# Patient Record
Sex: FEMALE | Race: WHITE | NOT HISPANIC OR LATINO | URBAN - METROPOLITAN AREA
[De-identification: names, ages, dates, MRNs, and addresses within clinical notes are randomized per-mention and may not be internally consistent; named-entity substitution may affect disease eponyms.]

---

## 2019-05-18 ENCOUNTER — HOSPITAL ENCOUNTER (OUTPATIENT)
Facility: CLINIC | Age: 69
Discharge: LEFT BEFORE DISCHARGE | End: 2019-05-18
Payer: MEDICARE

## 2019-06-26 ENCOUNTER — TRANSCRIBE ORDERS (OUTPATIENT)
Dept: REGISTRATION | Age: 69
End: 2019-06-26

## 2019-06-26 DIAGNOSIS — M75.01 ADHESIVE CAPSULITIS OF RIGHT SHOULDER: ICD-10-CM

## 2019-06-26 DIAGNOSIS — M75.111 INCOMPLETE TEAR OF RIGHT ROTATOR CUFF: Primary | ICD-10-CM

## 2019-06-26 DIAGNOSIS — M75.02 ADHESIVE CAPSULITIS OF LEFT SHOULDER: ICD-10-CM

## 2019-07-08 ENCOUNTER — HOSPITAL ENCOUNTER (OUTPATIENT)
Dept: PHYSICAL THERAPY | Age: 69
Setting detail: THERAPIES SERIES
Discharge: HOME | End: 2019-07-08
Attending: ORTHOPAEDIC SURGERY
Payer: MEDICARE

## 2019-07-08 DIAGNOSIS — M75.02 ADHESIVE CAPSULITIS OF LEFT SHOULDER: ICD-10-CM

## 2019-07-08 DIAGNOSIS — M75.111 INCOMPLETE TEAR OF RIGHT ROTATOR CUFF: ICD-10-CM

## 2019-07-08 DIAGNOSIS — M75.01 ADHESIVE CAPSULITIS OF RIGHT SHOULDER: ICD-10-CM

## 2019-07-08 PROCEDURE — 97161 PT EVAL LOW COMPLEX 20 MIN: CPT | Mod: GP

## 2019-07-08 PROCEDURE — 97530 THERAPEUTIC ACTIVITIES: CPT | Mod: GP

## 2019-07-08 RX ORDER — CELECOXIB 200 MG/1
200 CAPSULE ORAL 2 TIMES DAILY
COMMUNITY

## 2019-07-08 RX ORDER — GABAPENTIN 300 MG/1
300 CAPSULE ORAL 3 TIMES DAILY
COMMUNITY

## 2019-07-08 RX ORDER — CITALOPRAM 20 MG/1
30 TABLET, FILM COATED ORAL DAILY
COMMUNITY

## 2019-07-08 NOTE — OP PT TREATMENT LOG
Precautions    Treatment Grp Current Session Time   Modalities Total Time for Session Not performed   Heat/Ice CP PRN    Vasocompression    Modalities Total Time for Session Not performed   TENS    NMES    HWAVE    Manual  Total Time for Session Not performed   STM/MFR/TPR STM/TPR posterior shoulder and pec   Instrument Assisted STM     Mobilizations Distraction and grade 1/2 PRN   ROM/Flexibility    Myofascial Decompression    Therapeutic Exercise Total Time for Session Not performed    Sets Reps Load Comment    Shoulder Extension        Hor Abd     Pronated    Hor Abd     Supinated   Protracion     Quadruped   Protraction      Full Plank on Wall    TRX Row                                   Neuromuscular Re-Education Total Time for Session Not performed    Sets Reps Load Comment                                   Alternating Isometrics     Manual Isometrics     Gait Total Time for Session Not performed           Therapeutic Activity Total Time for Session 8-22 Minutes   Patient Education HEP reviewed and POC education completed       Group Total Time for Session Not performed

## 2019-07-08 NOTE — PROGRESS NOTES
Referring Provider: By co-signing this Plan of Care (POC), you agree with the planned services and interventions recommended by the therapist.         PT EVALUATION FOR OUTPATIENT THERAPY    Patient: Nicole Tong    MRN: [id number]  : 1950 68 y.o.   Referring Physician: Checo Petit MD  Date of Visit: 19    Certification Dates:   19 through 19    Recommended Frequency & Duration:  2 times/week for up to 8 weeks     Diagnosis:   1. Incomplete tear of right rotator cuff    2. Adhesive capsulitis of left shoulder    3. Adhesive capsulitis of right shoulder        Past Medical History:   Past Medical History:   Diagnosis Date   • DDD (degenerative disc disease), lumbar    • Migraine    • OA (osteoarthritis)    • Osteoporosis    • Scoliosis        Past Surgical History: No past surgical history on file.          General Information - 19 0809        Session Details    Document Type initial evaluation    Mode of Treatment physical therapy    Patient/Family Observations Bilateral shoulder pain with right greater than left.  Right side neck pain that comes up into my head.  I started having pain in my shoulders while I was doing yoga 3 to 4 days a week, I thought I was building strength, I have been doing yoga for years.  I fell out of a yoga pose in March and landed on my right shoulder, than I fell off a curb while talking to my friend  and than it really started to bother me.  Since than I have not been doing yoga I have only been doing walking, right hand dominant.  My neck is also bad right now and I have been getting shots in my neck, they help with my migraine, I have been getting migraines for about 2 years since I lost my son.         Time Calculation    Start Time 807    Stop Time 900    Time Calculation (min) 53 min            Falls - 19 0816        Initial Falls Assessment    One or more falls in the last year Yes    How many times 2 or more    Was the  patient injured in any fall No    Fall prevention interventions recommended Educate and re-educate the patient on safety strategies            Pain/Vitals - 07/08/19 0814        Pain/Comfort/Sleep    Presence of Pain complains of pain/discomfort    Preferred Pain Scale number (Numeric Rating Pain Scale)    Pain Body Location shoulder    Pain Rating (0-10): Pre Activity 0   At rest     Pain Rating (0-10): Activity 8   Lifting     Pain Rating (0-10): Post Activity 2       Pain Intervention    Intervention  Evaluation     Post Intervention Comments HEP and POC education            Range of Motion - 07/08/19 0800        RIGHT: Upper Extremity PROM Assessment    Shoulder Abduction Deficit 170 degrees       LEFT: Upper Extremity AROM Assessment    Shoulder Flexion Deficit 165 degrees    Shoulder Abduction Deficit 170 degrees    Shoulder Internal Rotation Deficit 65 degrees    Shoulder External Rotation Deficit 98 degrees       RIGHT: Upper Extremity AROM Assessment    Shoulder Flexion Deficit 165 degrees    Shoulder Abduction Deficit 155 degrees    Shoulder Internal Rotation Deficit 53 degrees    Shoulder External Rotation Deficit 102 degrees       Cervical AROM    Cervical Flexion 40    Cervical Extension 35    Cervical Left SB 34    Cervical Right SB 37    Cervical Left Rotation 57    Cervical Right Rotation 54            Manual Muscle Tests - 07/08/19 0834        RIGHT: Upper Extremity Manual Muscle Test Assessment    Right UE MMT comments: Hand Held Dynamometry    Shoulder Flexion gross movement --   7.3#    Shoulder Extension gross movement --   11.9#    Shoulder Abduction gross movement --   7.3#    Shoulder Internal Rotation gross movement --   6.9#    Shoulder External Rotation gross movement --   10.8#    Elbow Flex gross movement --   8.1#    Elbow Extension gross movement --   11.5#       LEFT: Upper Extremity Manual Muscle Test Assessment    Left UE MMT comments: Hand Held Dynamometry    Shoulder Flexion gross  movement --   8.8#    Shoulder Extension gross movement --   15.4#    Shoulder Abduction gross movement --   5#    Shoulder Internal Rotation gross movement --   9.6#    Shoulder External Rotation gross movement --   9.2#    Elbow Flexion gross movement --   14.6#    Elbow Extension gross movement --   10.8#            Evaluation Assessment and Plan - 07/08/19 1619        Evaluation Assessment and Plan    Plan of Care reviewed and patient/family in agreement Yes    System Pathology/Pathophysiology Noted musculoskeletal    Functional Limitations in Following Categories (PT Eval) self-care;home management;community/leisure    Rehab Potential/Prognosis good, to achieve stated therapy goals    Problem List decreased strength;impaired motor control;pain    Clinical Assessment Nicole is a 69 y/o female with reports of right > left shoulder pain over several months that was exacerbated with yoga and daily activity.  She continued to experience pain with lifting overhead and underwent MRI imaging that demonstrated right subscapularis, infraspinatus tear, and labral degeneration.  She notes that during yoga she would feel a click or unstable position at times when reaching overhead however she thought this was ok as per the instructor.  Nicole prefers to stretch and does not prefer to complete strength exercises when she works out independently.  Therapy evaluation demonstrates bilateral shoulder hypermobliity and joint laxity.  AROM is WNl into all planes when place in antigravity positions with a painful arc noted when actively moving with resistance.  Strength is grossly diminished with pain only noted during shoulder abduction bilaterally during MMT.  Nicole also reports right cervical pain with associated migraines that she has had x 2 years since her son passed away.  She has noted SCM and suboccipital restrictions with radicular symptoms into her frontal lobe with palpation.  Cervicla AROM is WNL with negative spurlings,  intact DTR, and no production of radicular symptoms with examination.  Nicole will progress with physical therapy to decrease pain, improve shoulder stabilization, manage soft tissue restrictions and reach stated goals.  Continue with physical therapy 2x a week x 8 weeks.     Planned Services CPT 36994 Therapeutic activities;CPT 43628 Manual therapy;CPT 19837 Hot/Cold Packs therapy;CPT 62458 Therpeutic exercises;CPT 38110 Neuromuscular Reeducation              Precautions    Treatment Grp Current Session Time   Modalities Total Time for Session Not performed   Heat/Ice CP PRN    Vasocompression    Modalities Total Time for Session Not performed   TENS    NMES    HWAVE    Manual  Total Time for Session Not performed   STM/MFR/TPR STM/TPR posterior shoulder and pec   Instrument Assisted STM     Mobilizations Distraction and grade 1/2 PRN   ROM/Flexibility    Myofascial Decompression    Therapeutic Exercise Total Time for Session Not performed    Sets Reps Load Comment    Shoulder Extension        Hor Abd     Pronated    Hor Abd     Supinated   Protracion     Quadruped   Protraction      Full Plank on Wall    TRX Row                                   Neuromuscular Re-Education Total Time for Session Not performed    Sets Reps Load Comment                                   Alternating Isometrics     Manual Isometrics     Gait Total Time for Session Not performed           Therapeutic Activity Total Time for Session 8-22 Minutes   Patient Education HEP reviewed and POC education completed       Group Total Time for Session Not performed                             Goals:  Goals        Patient Stated    • Patient (pt-stated)            Return to yoga and use my arms without pain            Other    • LTG Shoulder            Long Term Goals Time Frame Result Comment/Progress   Pt will increase bilateral UE MMT into flexion, abduction, ER and IR >/= 8# pain free 8  weeks     Pt will increase bilatera UE ROM WNL pain free to  return to yoga class  8  weeks     Lift overhead pain free  8  weeks     Increase CRISTINA >/= 90% to increase function   8  weeks     Become independent with HEP  8  weeks     Verbalize understanding of hypermobility and safe exercise  8   weeks     Decrease pain at worst </= 2/10  8  weeks               • STG Shoulder            Short Term Goals Time Frame Result Comment/Progress   Pt will increase bilateral UE MMT into flexion, abduction, ER and IR >/= 5# 4  weeks     Pt will increase bilateral UE ROM >/= WNL into flexion, extension to improve functional mobility without pain or fear of pain 4  weeks     Complete closed chain protraction and retraction pain free 4  weeks     Increase CRISTINA >/= 10 points to increase function   4  weeks  Initial 55%   Become independent with HEP  4  weeks     Decrease pain at worst </= 4/10  4  weeks                         This 68 y.o. year old female presents to PT with above stated diagnosis. Physical Therapy evaluation reveals decreased strength, impaired motor control, pain resulting in self-care, home management, community/leisure limitations. Nicole Tong will benefit from skilled PT services to address limitation, work towards rehab and patient goals and maximize PLOF of chosen ADLs.     Planned Services: The patient's treatment will include gait training, joint and soft tissue mobilization, manual therapy, neuromuscular re-education, physical reconditioning, therapeutic activities, therapeutic exercises and attended E-Stim.

## 2019-07-08 NOTE — LETTER
[address]  [place] [address]  [facility] OP Therapy Fax: [phone]    PHYSICAL THERAPY PLAN OF CARE    Patient Name: Nicole Tong    Certification Dates:  From 19  To: 19  Frequency: 2 times/week Duration: 8 weeks  Other:      Provider: Benito Espinal PT   Referring Provider: Checo Petit MD  PCP: Nicole Ibrahim MD      Payor: Payor: MEDICARE / Plan: MEDICARE PART A & B / Product Type: Medicare /   Medical Diagnosis: No primary diagnosis found.     Rehab Potential: good, to achieve stated therapy goals    Planned Services: The patient's treatment will include CPT 09930 Therapeutic activities, CPT 85576 Manual therapy, CPT 58927 Hot/Cold Packs therapy, CPT 94646 Therpeutic exercises, CPT 69573 Neuromuscular Reeducation, .     By signing this plan of care, I certify this plan of care as correct and necessary for the patient.        Physician Signature: _________________________________________ Date: _________________    Thank you for this referral. Please contact our department with any questions.      Benito Espinal PT        Referring Provider: By co-signing this Plan of Care (POC), you agree with the planned services and interventions recommended by the therapist.         PT EVALUATION FOR OUTPATIENT THERAPY    Patient: Nicole Tong    MRN: [id number]  : 1950 68 y.o.   Referring Physician: Checo Petit MD  Date of Visit: 19    Certification Dates:   19 through 19    Recommended Frequency & Duration:  2 times/week for up to 8 weeks     Diagnosis:   1. Incomplete tear of right rotator cuff    2. Adhesive capsulitis of left shoulder    3. Adhesive capsulitis of right shoulder        Past Medical History:   Past Medical History:   Diagnosis Date   • DDD (degenerative disc disease), lumbar    • Migraine    • OA (osteoarthritis)    • Osteoporosis    • Scoliosis        Past Surgical History: No past surgical history on file.          General Information -  07/08/19 0809        Session Details    Document Type initial evaluation    Mode of Treatment physical therapy    Patient/Family Observations Bilateral shoulder pain with right greater than left.  Right side neck pain that comes up into my head.  I started having pain in my shoulders while I was doing yoga 3 to 4 days a week, I thought I was building strength, I have been doing yoga for years.  I fell out of a yoga pose in March and landed on my right shoulder, than I fell off a curb while talking to my friend April 3rd and than it really started to bother me.  Since than I have not been doing yoga I have only been doing walking, right hand dominant.  My neck is also bad right now and I have been getting shots in my neck, they help with my migraine, I have been getting migraines for about 2 years since I lost my son.         Time Calculation    Start Time 0807    Stop Time 0900    Time Calculation (min) 53 min            Falls - 07/08/19 0816        Initial Falls Assessment    One or more falls in the last year Yes    How many times 2 or more    Was the patient injured in any fall No    Fall prevention interventions recommended Educate and re-educate the patient on safety strategies            Pain/Vitals - 07/08/19 0814        Pain/Comfort/Sleep    Presence of Pain complains of pain/discomfort    Preferred Pain Scale number (Numeric Rating Pain Scale)    Pain Body Location shoulder    Pain Rating (0-10): Pre Activity 0   At rest     Pain Rating (0-10): Activity 8   Lifting     Pain Rating (0-10): Post Activity 2       Pain Intervention    Intervention  Evaluation     Post Intervention Comments HEP and POC education            Range of Motion - 07/08/19 0800        RIGHT: Upper Extremity PROM Assessment    Shoulder Abduction Deficit 170 degrees       LEFT: Upper Extremity AROM Assessment    Shoulder Flexion Deficit 165 degrees    Shoulder Abduction Deficit 170 degrees    Shoulder Internal Rotation Deficit 65 degrees     Shoulder External Rotation Deficit 98 degrees       RIGHT: Upper Extremity AROM Assessment    Shoulder Flexion Deficit 165 degrees    Shoulder Abduction Deficit 155 degrees    Shoulder Internal Rotation Deficit 53 degrees    Shoulder External Rotation Deficit 102 degrees       Cervical AROM    Cervical Flexion 40    Cervical Extension 35    Cervical Left SB 34    Cervical Right SB 37    Cervical Left Rotation 57    Cervical Right Rotation 54            Manual Muscle Tests - 07/08/19 0834        RIGHT: Upper Extremity Manual Muscle Test Assessment    Right UE MMT comments: Hand Held Dynamometry    Shoulder Flexion gross movement --   7.3#    Shoulder Extension gross movement --   11.9#    Shoulder Abduction gross movement --   7.3#    Shoulder Internal Rotation gross movement --   6.9#    Shoulder External Rotation gross movement --   10.8#    Elbow Flex gross movement --   8.1#    Elbow Extension gross movement --   11.5#       LEFT: Upper Extremity Manual Muscle Test Assessment    Left UE MMT comments: Hand Held Dynamometry    Shoulder Flexion gross movement --   8.8#    Shoulder Extension gross movement --   15.4#    Shoulder Abduction gross movement --   5#    Shoulder Internal Rotation gross movement --   9.6#    Shoulder External Rotation gross movement --   9.2#    Elbow Flexion gross movement --   14.6#    Elbow Extension gross movement --   10.8#            Evaluation Assessment and Plan - 07/08/19 1619        Evaluation Assessment and Plan    Plan of Care reviewed and patient/family in agreement Yes    System Pathology/Pathophysiology Noted musculoskeletal    Functional Limitations in Following Categories (PT Eval) self-care;home management;community/leisure    Rehab Potential/Prognosis good, to achieve stated therapy goals    Problem List decreased strength;impaired motor control;pain    Clinical Assessment Nicole is a 67 y/o female with reports of right > left shoulder pain over several months that was  exacerbated with yoga and daily activity.  She continued to experience pain with lifting overhead and underwent MRI imaging that demonstrated right subscapularis, infraspinatus tear, and labral degeneration.  She notes that during yoga she would feel a click or unstable position at times when reaching overhead however she thought this was ok as per the instructor.  Nicole prefers to stretch and does not prefer to complete strength exercises when she works out independently.  Therapy evaluation demonstrates bilateral shoulder hypermobliity and joint laxity.  AROM is WNl into all planes when place in antigravity positions with a painful arc noted when actively moving with resistance.  Strength is grossly diminished with pain only noted during shoulder abduction bilaterally during MMT.  Nicole also reports right cervical pain with associated migraines that she has had x 2 years since her son passed away.  She has noted SCM and suboccipital restrictions with radicular symptoms into her frontal lobe with palpation.  Cervicla AROM is WNL with negative spurlings, intact DTR, and no production of radicular symptoms with examination.  Nicole will progress with physical therapy to decrease pain, improve shoulder stabilization, manage soft tissue restrictions and reach stated goals.  Continue with physical therapy 2x a week x 8 weeks.     Planned Services CPT 32641 Therapeutic activities;CPT 07894 Manual therapy;CPT 20665 Hot/Cold Packs therapy;CPT 72520 Therpeutic exercises;CPT 80873 Neuromuscular Reeducation              Precautions    Treatment Grp Current Session Time   Modalities Total Time for Session Not performed   Heat/Ice CP PRN    Vasocompression    Modalities Total Time for Session Not performed   TENS    NMES    HWAVE    Manual  Total Time for Session Not performed   STM/MFR/TPR STM/TPR posterior shoulder and pec   Instrument Assisted STM     Mobilizations Distraction and grade 1/2 PRN   ROM/Flexibility    Myofascial  Decompression    Therapeutic Exercise Total Time for Session Not performed    Sets Reps Load Comment    Shoulder Extension        Hor Abd     Pronated    Hor Abd     Supinated   Protracion     Quadruped   Protraction      Full Plank on Wall    TRX Row                                   Neuromuscular Re-Education Total Time for Session Not performed    Sets Reps Load Comment                                   Alternating Isometrics     Manual Isometrics     Gait Total Time for Session Not performed           Therapeutic Activity Total Time for Session 8-22 Minutes   Patient Education HEP reviewed and POC education completed       Group Total Time for Session Not performed                             Goals:  Goals        Patient Stated    • Patient (pt-stated)            Return to yoga and use my arms without pain            Other    • LTG Shoulder            Long Term Goals Time Frame Result Comment/Progress   Pt will increase bilateral UE MMT into flexion, abduction, ER and IR >/= 8# pain free 8  weeks     Pt will increase bilatera UE ROM WNL pain free to return to yoga class  8  weeks     Lift overhead pain free  8  weeks     Increase CRISTINA >/= 90% to increase function   8  weeks     Become independent with HEP  8  weeks     Verbalize understanding of hypermobility and safe exercise  8   weeks     Decrease pain at worst </= 2/10  8  weeks               • STG Shoulder            Short Term Goals Time Frame Result Comment/Progress   Pt will increase bilateral UE MMT into flexion, abduction, ER and IR >/= 5# 4  weeks     Pt will increase bilateral UE ROM >/= WNL into flexion, extension to improve functional mobility without pain or fear of pain 4  weeks     Complete closed chain protraction and retraction pain free 4  weeks     Increase CRISTINA >/= 10 points to increase function   4  weeks  Initial 55%   Become independent with HEP  4  weeks     Decrease pain at worst </= 4/10  4  weeks                         This 68 y.o.  year old female presents to PT with above stated diagnosis. Physical Therapy evaluation reveals decreased strength, impaired motor control, pain resulting in self-care, home management, community/leisure limitations. Nicole Tong will benefit from skilled PT services to address limitation, work towards rehab and patient goals and maximize PLOF of chosen ADLs.     Planned Services: The patient's treatment will include gait training, joint and soft tissue mobilization, manual therapy, neuromuscular re-education, physical reconditioning, therapeutic activities, therapeutic exercises and attended E-Stim.

## 2019-07-10 ENCOUNTER — HOSPITAL ENCOUNTER (OUTPATIENT)
Dept: PHYSICAL THERAPY | Age: 69
Setting detail: THERAPIES SERIES
Discharge: HOME | End: 2019-07-10
Attending: ORTHOPAEDIC SURGERY
Payer: MEDICARE

## 2019-07-10 DIAGNOSIS — M75.02 ADHESIVE CAPSULITIS OF LEFT SHOULDER: ICD-10-CM

## 2019-07-10 DIAGNOSIS — M75.01 ADHESIVE CAPSULITIS OF RIGHT SHOULDER: ICD-10-CM

## 2019-07-10 DIAGNOSIS — M75.111 INCOMPLETE TEAR OF RIGHT ROTATOR CUFF: Primary | ICD-10-CM

## 2019-07-10 PROCEDURE — 97530 THERAPEUTIC ACTIVITIES: CPT | Mod: GP

## 2019-07-10 PROCEDURE — 97110 THERAPEUTIC EXERCISES: CPT | Mod: GP

## 2019-07-10 NOTE — PROGRESS NOTES
PT DAILY NOTE FOR OUTPATIENT THERAPY    Patient: Nicole Tong   MRN: [id number]  : 1950 68 y.o.  Referring Physician: Checo Petit MD  Date of Visit: 7/10/2019      Certification Dates: 19 through 19    Diagnosis:   1. Incomplete tear of right rotator cuff    2. Adhesive capsulitis of left shoulder    3. Adhesive capsulitis of right shoulder            TODAY'S VISIT          General Information - 07/10/19 1002        Session Details    Document Type daily treatment    Mode of Treatment physical therapy    Patient/Family Observations No pain at rest, had some pain this morning when getting washed this morning.         Time Calculation    Start Time 1000    Stop Time 1100    Time Calculation (min) 60 min              Pain/Vitals - 07/10/19 1003        Pain/Comfort/Sleep    Presence of Pain complains of pain/discomfort    Preferred Pain Scale number (Numeric Rating Pain Scale)    Pain Body Location shoulder    Pain Rating (0-10): Pre Activity 0    Pain Rating (0-10): Activity 2    Pain Rating (0-10): Post Activity 0       Pain Intervention    Intervention  Manual techniques    Post Intervention Comments Sore in both shoulders.               Daily Falls Screen - 07/10/19 1004        Daily Falls Assessment    Patient reported fall since last visit No              Daily Treatment Assessment and Plan - 07/10/19 1050        Daily Treatment Assessment and Plan    Progress toward goals Progressing    Daily Outcome Summary Nicole reports semi-compliance with HEP since evaluation and continued right > left shoulder pain with daily activity this morning.  She was able to walk x 30 minutes pain free however has not returned to normal exercise routine.   Through session Nicole noted that she can not attain quadruped due to right knee pain and is uncomfotable in prone due to a previous history of disc problems.  Good tolerance for manual techniques today with full pain free PROM that is unrestricted today,  initial patient apprehension present with PROM.  VC and MC required today with alternating isometrics and ther ex today to maintain scapular depression.  Education provided on high rep low resistance exericise and encouraged to continue with fitness center activity including LE and trunk stabilization.  Nicole will continue to benefit from therapy to improve neuromuscular control and decrease pain with daily activity.    Plan and Recommendations Progress stabilization exercsies next session.             Today's Treatment:        Precautions Avoid prone and quadruped   Treatment Grp Current Session Time   Modalities Total Time for Session Not performed   Heat/Ice CP PRN    Vasocompression    Modalities Total Time for Session Not performed   TENS    NMES    HWAVE    Manual  Total Time for Session 8-22 Minutes   STM/MFR/TPR STM/TPR posterior shoulder and pec   Instrument Assisted STM     Mobilizations Distraction and grade 1/2 PRN   ROM/Flexibility    Myofascial Decompression    Therapeutic Exercise Total Time for Session 23-37 Minutes    Sets Reps Load Comment    Shoulder Extension   15 [place] hor abd at bottom of pull, MC for abdominal control   Hor Abd   15 [place] Pronated, seated   Hor Abd   15 [place] Supinated, seated   Protract/Retract   10  Full Plank on Wall    TRX Row                                   Neuromuscular Re-Education Total Time for Session 8-22 Minutes    Sets Reps Load Comment                                   Alternating Isometrics  90 degrees of flexion x 3 each    Manual Isometrics  ER through range with control of depression with MC   Gait Total Time for Session Not performed           Therapeutic Activity Total Time for Session Not performed           Group Total Time for Session Not performed

## 2019-07-10 NOTE — OP PT TREATMENT LOG
Precautions Avoid prone and quadruped   Treatment Grp Current Session Time   Modalities Total Time for Session Not performed   Heat/Ice CP PRN    Vasocompression    Modalities Total Time for Session Not performed   TENS    NMES    HWAVE    Manual  Total Time for Session 8-22 Minutes   STM/MFR/TPR STM/TPR posterior shoulder and pec   Instrument Assisted STM     Mobilizations Distraction and grade 1/2 PRN   ROM/Flexibility    Myofascial Decompression    Therapeutic Exercise Total Time for Session 23-37 Minutes    Sets Reps Load Comment    Shoulder Extension   15 [place] hor abd at bottom of pull, MC for abdominal control   Hor Abd   15 [place] Pronated, seated   Hor Abd   15 [place] Supinated, seated   Protract/Retract   10  Full Plank on Wall    TRX Row                                   Neuromuscular Re-Education Total Time for Session 8-22 Minutes    Sets Reps Load Comment                                   Alternating Isometrics  90 degrees of flexion x 3 each    Manual Isometrics  ER through range with control of depression with MC   Gait Total Time for Session Not performed           Therapeutic Activity Total Time for Session Not performed           Group Total Time for Session Not performed

## 2019-07-10 NOTE — OP PT TREATMENT LOG
Precautions    Treatment Grp Current Session Time   Modalities Total Time for Session Not performed   Heat/Ice CP PRN    Vasocompression    Modalities Total Time for Session Not performed   TENS    NMES    HWAVE    Manual  Total Time for Session 8-22 Minutes   STM/MFR/TPR STM/TPR posterior shoulder and pec   Instrument Assisted STM     Mobilizations Distraction and grade 1/2 PRN   ROM/Flexibility    Myofascial Decompression    Therapeutic Exercise Total Time for Session 23-37 Minutes    Sets Reps Load Comment    Shoulder Extension   15 [place] Pink hor abd at bottom of pull, MC for abdominal control   Hor Abd   15 [place] Pronated, seated   Hor Abd   15 [place] Supinated, seated   Protract/Retract   10  Full Plank on Wall    TRX Row                                   Neuromuscular Re-Education Total Time for Session 8-22 Minutes    Sets Reps Load Comment                                   Alternating Isometrics  90 degrees of flexion x 3 each    Manual Isometrics  ER through range with control of depression with MC   Gait Total Time for Session Not performed           Therapeutic Activity Total Time for Session Not performed           Group Total Time for Session Not performed

## 2019-07-24 ENCOUNTER — HOSPITAL ENCOUNTER (OUTPATIENT)
Dept: PHYSICAL THERAPY | Age: 69
Setting detail: THERAPIES SERIES
Discharge: HOME | End: 2019-07-24
Attending: ORTHOPAEDIC SURGERY
Payer: MEDICARE

## 2019-07-24 DIAGNOSIS — M75.02 ADHESIVE CAPSULITIS OF LEFT SHOULDER: ICD-10-CM

## 2019-07-24 DIAGNOSIS — M75.01 ADHESIVE CAPSULITIS OF RIGHT SHOULDER: ICD-10-CM

## 2019-07-24 DIAGNOSIS — M75.111 INCOMPLETE TEAR OF RIGHT ROTATOR CUFF: Primary | ICD-10-CM

## 2019-07-24 PROCEDURE — 97110 THERAPEUTIC EXERCISES: CPT | Mod: 59

## 2019-07-24 PROCEDURE — 97530 THERAPEUTIC ACTIVITIES: CPT | Mod: 59

## 2019-07-24 NOTE — OP PT TREATMENT LOG
Precautions Avoid prone and quadruped   Treatment Grp Current Session Time   Modalities Total Time for Session Not performed   Heat/Ice CP PRN    Vasocompression    Modalities Total Time for Session Not performed   TENS    NMES    HWAVE    Manual  Total Time for Session 8-22 Minutes   STM/MFR/TPR STM/TPR posterior shoulder and pec   Instrument Assisted STM     Mobilizations Distraction and grade 1/2    ROM/Flexibility    Myofascial Decompression    Therapeutic Exercise Total Time for Session 23-37 Minutes    Sets Reps Load Comment    Shoulder Flexion    10  Pink  Supine Hor Abd bias, VC for GH ER    Shoulder Flexion   2 15 [place]  Supine   Shoulder Extension  2 15 [place] hor abd at bottom of pull, MC for abdominal control   Shoulder ER U/L  2 15 [place]    Shoulder ER B/L  2 15 [place]    Hor Abd   15 [place] Pronated, standing   Hor Abd   15 [place] Supinated, standing                                             Neuromuscular Re-Education Total Time for Session Not performed    Sets Reps Load Comment                                             Gait Total Time for Session Not performed           Therapeutic Activity Total Time for Session 8-22 Minutes   HEP Progression Educated on HEP progression and POC       Group Total Time for Session Not performed

## 2019-07-24 NOTE — PROGRESS NOTES
PT DAILY NOTE FOR OUTPATIENT THERAPY    Patient: Nicole Tong   MRN: [id number]  : 1950 68 y.o.  Referring Physician: Checo Petit MD  Date of Visit: 2019      Certification Dates: 19 through 19    Diagnosis:   1. Incomplete tear of right rotator cuff    2. Adhesive capsulitis of left shoulder    3. Adhesive capsulitis of right shoulder          TODAY'S VISIT          General Information - 19 0906        Session Details    Document Type daily treatment    Mode of Treatment physical therapy    Patient/Family Observations I keep doing stupid stuff and it continues to bother me. The last time I had pain was in the car driving here in my biceps.     OP [facility]       Time Calculation    Start Time 0900    Stop Time 1000    Time Calculation (min) 60 min              Pain/Vitals - 19 0908        Pain/Comfort/Sleep    Presence of Pain complains of pain/discomfort    Preferred Pain Scale number (Numeric Rating Pain Scale)    Pain Body Location arm    Pain Rating (0-10): Pre Activity 0    Pain Rating (0-10): Activity 2    Pain Rating (0-10): Post Activity 0       Pain Intervention    Intervention  Manual technqiues    Post Intervention Comments See assessment       Pain    Pain Body Location - Side Bilateral              Daily Falls Screen - 19 0908        Daily Falls Assessment    Patient reported fall since last visit No              Daily Treatment Assessment and Plan - 19 0946        Daily Treatment Assessment and Plan    Progress toward goals Progressing    Daily Outcome Summary Nicole followed up with Dr. Petit who recommended right shoulder surgery, Nicole has been away for 2 weeks and has been compliant with HEP.  She presents with full PROM into all planes pain free with minimal loss of motion into IR.  AROM of right and left shoulder are also WNL however Nicole notes right greater than left shoulder soreness.   She was able to complete all exercises  today pain free with VC for exercsie modifications to control tension of band and scapular control.  No limitations in joint mobility noted today with GH gliyosi, grade 1/2 performed for pain management.  Nicole notes difficulty with ADL's and lifting in home and is concerned that without surgery she will be unable to compelte daily tasks.   HEP updated this session for shoulder stabilization as she would like to return to golf and yoga without restriction, education provided on appropriate pain response and muscle soreness.  Continue with PT 2x a week to reach stated goals.      Plan and Recommendations Progress stabilization exercsies next session.             Today's Treatment:        Precautions Avoid prone and quadruped   Treatment Grp Current Session Time   Modalities Total Time for Session Not performed   Heat/Ice CP PRN    Vasocompression    Modalities Total Time for Session Not performed   TENS    NMES    HWAVE    Manual  Total Time for Session 8-22 Minutes   STM/MFR/TPR STM/TPR posterior shoulder and pec   Instrument Assisted STM     Mobilizations Distraction and grade 1/2    ROM/Flexibility    Myofascial Decompression    Therapeutic Exercise Total Time for Session 23-37 Minutes    Sets Reps Load Comment    Shoulder Flexion    10  Pink  Supine Hor Abd bias, VC for GH ER    Shoulder Flexion   2 15 Pink  Supine   Shoulder Extension  2 15 [place] hor abd at bottom of pull, MC for abdominal control   Shoulder ER U/L  2 15 [place]    Shoulder ER B/L  2 15 [place]    Hor Abd   15 [place] Pronated, standing   Hor Abd   15 [place] Supinated, standing                                             Neuromuscular Re-Education Total Time for Session Not performed    Sets Reps Load Comment                                             Gait Total Time for Session Not performed           Therapeutic Activity Total Time for Session 8-22 Minutes   HEP Progression Educated on HEP progression and POC       Group Total Time for  Session Not performed

## 2019-07-26 ENCOUNTER — HOSPITAL ENCOUNTER (OUTPATIENT)
Dept: PHYSICAL THERAPY | Age: 69
Setting detail: THERAPIES SERIES
Discharge: HOME | End: 2019-07-26
Attending: ORTHOPAEDIC SURGERY
Payer: MEDICARE

## 2019-07-26 DIAGNOSIS — M75.111 INCOMPLETE TEAR OF RIGHT ROTATOR CUFF: Primary | ICD-10-CM

## 2019-07-26 PROCEDURE — 97110 THERAPEUTIC EXERCISES: CPT | Mod: GP

## 2019-07-26 PROCEDURE — 97010 HOT OR COLD PACKS THERAPY: CPT | Mod: GP

## 2019-07-26 PROCEDURE — 97140 MANUAL THERAPY 1/> REGIONS: CPT | Mod: GP

## 2019-07-26 NOTE — OP PT TREATMENT LOG
Precautions Avoid prone and quadruped   Treatment Grp Current Session Time   Modalities Total Time for Session 8-22 Minutes   Heat/Ice CP 10 minutes b/l Shoulder   Vasocompression    Modalities Total Time for Session Not performed   TENS    NMES    HWAVE    Manual  Total Time for Session 8-22 Minutes   STM/MFR/TPR STM/TPR posterior shoulder and pec   Instrument Assisted STM     Mobilizations    ROM/Flexibility    Myofascial Decompression    Therapeutic Exercise Total Time for Session 23-37 Minutes    Sets Reps Load Comment    Shoulder Flexion    10  Pink  Supine Hor Abd bias, VC for GH ER    Shoulder Flexion   2 15 Pink  Supine   Shoulder Extension  2 15 [place] hor abd at bottom of pull, MC for abdominal control   Shoulder ER U/L  2 15 [place]    Shoulder ER B/L  2 15 [place]    Hor Abd   15 [place] Pronated, standing   Hor Abd     Supinated, standing                                             Neuromuscular Re-Education Total Time for Session Not performed    Sets Reps Load Comment                                             Gait Total Time for Session Not performed           Therapeutic Activity Total Time for Session Not performed           Group Total Time for Session Not performed

## 2019-07-26 NOTE — PROGRESS NOTES
PT DAILY NOTE FOR OUTPATIENT THERAPY    Patient: Nicole Tong   MRN: [id number]  : 1950 68 y.o.  Referring Physician: Checo Petit MD  Date of Visit: 2019      Certification Dates: 19 through 19    Diagnosis:   1. Incomplete tear of right rotator cuff        TODAY'S VISIT          General Information - 19 1016        Session Details    Document Type daily treatment    Mode of Treatment physical therapy    Patient/Family Observations I have soreness today. But no pain.        Time Calculation    Start Time 1015    Stop Time 1100    Time Calculation (min) 45 min       General Information    Patient Profile Reviewed? yes              Pain/Vitals - 19 1015        Pain/Comfort/Sleep    Presence of Pain denies       Pain Intervention    Intervention  Manual Techniques    Post Intervention Comments See Assessment              Daily Falls Screen - 19 1017        Daily Falls Assessment    Patient reported fall since last visit No              Daily Treatment Assessment and Plan - 19 1106        Daily Treatment Assessment and Plan    Progress toward goals Progressing    Daily Outcome Summary Nicole arrived to PT with minimal pain in her shoulder. Continues with compliance of her HEP. Patient stated she feels tightness in her shoulder and up in to her neck. Nicole stated this causes her headaches. Continued with exercises to strengthen rotator cuff mucles. After completion of exercises patient reported an increase in pain in her left shoulder. Patient also verbalized she has difficulty telling the difference between pain and soreness. Discussed with Primary PT. Ended with CP to B/L shoulders.           Today's Treatment:        Precautions Avoid prone and quadruped   Treatment Grp Current Session Time   Modalities Total Time for Session 8-22 Minutes   Heat/Ice CP 10 minutes b/l Shoulder   Vasocompression    Modalities Total Time for Session Not performed   TENS    NMES     HWAVE    Manual  Total Time for Session 8-22 Minutes   STM/MFR/TPR STM/TPR posterior shoulder and pec   Instrument Assisted STM     Mobilizations    ROM/Flexibility    Myofascial Decompression    Therapeutic Exercise Total Time for Session 23-37 Minutes    Sets Reps Load Comment    Shoulder Flexion    10  Pink  Supine Hor Abd bias, VC for GH ER    Shoulder Flexion   2 15 Pink  Supine   Shoulder Extension  2 15 [place] hor abd at bottom of pull, MC for abdominal control   Shoulder ER U/L  2 15 [place]    Shoulder ER B/L  2 15 [place]    Hor Abd   15 [place] Pronated, standing   Hor Abd     Supinated, standing                                             Neuromuscular Re-Education Total Time for Session Not performed    Sets Reps Load Comment                                             Gait Total Time for Session Not performed           Therapeutic Activity Total Time for Session Not performed           Group Total Time for Session Not performed

## 2019-08-01 ENCOUNTER — HOSPITAL ENCOUNTER (OUTPATIENT)
Dept: PHYSICAL THERAPY | Age: 69
Setting detail: THERAPIES SERIES
Discharge: HOME | End: 2019-08-01
Attending: ORTHOPAEDIC SURGERY
Payer: MEDICARE

## 2019-08-01 DIAGNOSIS — M75.01 ADHESIVE CAPSULITIS OF RIGHT SHOULDER: ICD-10-CM

## 2019-08-01 DIAGNOSIS — M75.111 INCOMPLETE TEAR OF RIGHT ROTATOR CUFF: ICD-10-CM

## 2019-08-01 DIAGNOSIS — M75.02 ADHESIVE CAPSULITIS OF LEFT SHOULDER: Primary | ICD-10-CM

## 2019-08-01 PROCEDURE — 97140 MANUAL THERAPY 1/> REGIONS: CPT | Mod: GP

## 2019-08-01 PROCEDURE — 97110 THERAPEUTIC EXERCISES: CPT | Mod: GP

## 2019-08-01 PROCEDURE — G0283 ELEC STIM OTHER THAN WOUND: HCPCS | Mod: GP

## 2019-08-01 NOTE — OP PT TREATMENT LOG
Precautions Avoid prone and quadruped   Treatment Grp Current Session Time   Modalities Total Time for Session Not performed   Heat/Ice    Vasocompression    Modalities Total Time for Session 8-22 Minutes   TENS    NMES    HWAVE High 3.0 GH joint seated end of session   Manual  Total Time for Session 8-22 Minutes   STM/MFR/TPR    Instrument Assisted STM     Mobilizations Grade 1/2 GH mobilizations posterior and inferior, long axis distraction   ROM/Flexibility PROM right and left shoulder all planes   Myofascial Decompression    Therapeutic Exercise Total Time for Session 23-37 Minutes    Sets Reps Load Comment    Isometric Flexion    5  Punch into wall 25% strength hold 6 seconds   Isometric ER    5  25% hold 6 seconds   Isometric IR   5  25% hold 6 seconds   Isometric Extension    5  25% hold 6 seconds   Isometric Abduction    5  25% hold 6 seconds   Wall Slide   5  AROM eccentric lower   Serratus Slide   5  Elbows up wall                                             Neuromuscular Re-Education Total Time for Session Not performed    Sets Reps Load Comment                                             Gait Total Time for Session Not performed           Therapeutic Activity Total Time for Session Not performed           Group Total Time for Session Not performed

## 2019-08-01 NOTE — PROGRESS NOTES
PT DAILY NOTE FOR OUTPATIENT THERAPY    Patient: Nicole Tong   MRN: [id number]  : 1950 68 y.o.  Referring Physician: Checo Petit MD  Date of Visit: 2019      Certification Dates: 19 through 19    Diagnosis:   1. Adhesive capsulitis of left shoulder    2. Adhesive capsulitis of right shoulder    3. Incomplete tear of right rotator cuff          TODAY'S VISIT          General Information - 19 1005        Session Details    Document Type daily treatment    Mode of Treatment physical therapy    Patient/Family Observations I continue to have pain and it is not getting any better.  It still hurts when I lift anythign up or when I drive.        Time Calculation    Start Time 1005    Stop Time 1100    Time Calculation (min) 55 min              Pain/Vitals - 19 1005        Pain/Comfort/Sleep    Presence of Pain complains of pain/discomfort    Preferred Pain Scale number (Numeric Rating Pain Scale)    Pain Body Location shoulder   Right > left     Pain Rating (0-10): Pre Activity 0    Pain Rating (0-10): Activity 6    Pain Rating (0-10): Post Activity 0       Pain Intervention    Intervention  Manual techniques and Electircal stimulation     Post Intervention Comments See Assessment              Daily Falls Screen - 19 1008        Daily Falls Assessment    Patient reported fall since last visit No              Daily Treatment Assessment and Plan - 19 1045        Daily Treatment Assessment and Plan    Progress toward goals Slower than expected    Daily Outcome Summary Nicole presents to PT noting continued pain stating that her shoulders feel sick.  Her uncle who is 92 just had a RTC repair and he is in excrutiating pain at this time.  She has stopped her HEP due to pain with completion is unable to drive or lift without pain, she is able to blowdry her hair without limitations.  PROM bilateral shoulder is WNL pain free with normal GH mobility and no restriction, AROM  remains significantly limited by pain.  Altered POC to complete isometric activity in home, able to complete pain free in clinic today with good form.  Eccentric lower during wall slide completed pain free.  Ended session with Hwave high for residual pain control following session, no pain noted upon completion of session today.  Nicole has a TENS unit in her home and was educated on completion for shoulder pain as needed.  Nicole will continue to benefit from physical therapy to decrease pain and decerase shoulder reactivity.      Plan and Recommendations Progress note next session, Nicole will be travelling x 10 days to the [facility] following next session.             Today's Treatment:        Precautions Avoid prone and quadruped   Treatment Grp Current Session Time   Modalities Total Time for Session Not performed   Heat/Ice    Vasocompression    Modalities Total Time for Session 8-22 Minutes   TENS    NMES    HWAVE High 3.0 GH joint seated end of session   Manual  Total Time for Session 8-22 Minutes   STM/MFR/TPR    Instrument Assisted STM     Mobilizations Grade 1/2 GH mobilizations posterior and inferior, long axis distraction   ROM/Flexibility PROM right and left shoulder all planes   Myofascial Decompression    Therapeutic Exercise Total Time for Session 23-37 Minutes    Sets Reps Load Comment    Isometric Flexion    5  Punch into wall 25% strength hold 6 seconds   Isometric ER    5  25% hold 6 seconds   Isometric IR   5  25% hold 6 seconds   Isometric Extension    5  25% hold 6 seconds   Isometric Abduction    5  25% hold 6 seconds   Wall Slide   5  AROM eccentric lower   Serratus Slide   5  Elbows up wall                                             Neuromuscular Re-Education Total Time for Session Not performed    Sets Reps Load Comment                                             Gait Total Time for Session Not performed           Therapeutic Activity Total Time for Session Not performed           Group  Total Time for Session Not performed

## 2019-08-06 ENCOUNTER — HOSPITAL ENCOUNTER (OUTPATIENT)
Dept: PHYSICAL THERAPY | Age: 69
Setting detail: THERAPIES SERIES
Discharge: HOME | End: 2019-08-06
Attending: ORTHOPAEDIC SURGERY
Payer: MEDICARE

## 2019-08-06 DIAGNOSIS — M75.02 ADHESIVE CAPSULITIS OF LEFT SHOULDER: Primary | ICD-10-CM

## 2019-08-06 DIAGNOSIS — M75.111 INCOMPLETE TEAR OF RIGHT ROTATOR CUFF: ICD-10-CM

## 2019-08-06 DIAGNOSIS — M75.01 ADHESIVE CAPSULITIS OF RIGHT SHOULDER: ICD-10-CM

## 2019-08-06 PROCEDURE — 97140 MANUAL THERAPY 1/> REGIONS: CPT | Mod: GP

## 2019-08-06 PROCEDURE — 97530 THERAPEUTIC ACTIVITIES: CPT | Mod: 59

## 2019-08-06 PROCEDURE — 97110 THERAPEUTIC EXERCISES: CPT | Mod: GP

## 2019-08-06 NOTE — OP PT TREATMENT LOG
Precautions Avoid prone and quadruped   Treatment Grp Current Session Time   Modalities Total Time for Session Not performed   Heat/Ice    Vasocompression    Modalities Total Time for Session Not performed   TENS    NMES    HWAVE    Manual  Total Time for Session 8-22 Minutes   STM/MFR/TPR    Instrument Assisted STM     Mobilizations Grade 1/2 GH mobilizations posterior and inferior, long axis distraction   ROM/Flexibility PROM right and left shoulder all planes   Myofascial Decompression    Therapeutic Exercise Total Time for Session 23-37 Minutes    Sets Reps Load Comment                                                    Progress Note   Objective assessment completed        Neuromuscular Re-Education Total Time for Session Not performed    Sets Reps Load Comment                                             Gait Total Time for Session Not performed           Therapeutic Activity Total Time for Session 8-22 Minutes   Patient Education  POC education and review of yoga poses to continue in fitness center       Group Total Time for Session Not performed

## 2019-08-06 NOTE — PROGRESS NOTES
PT PROGRESS NOTE FOR OUTPATIENT THERAPY    Patient: Nicole Tong   MRN: [id number]  : 1950 68 y.o.  Referring Physician: Checo Petit MD  Date of Visit: 2019      Certification Dates: 19 through 19    Recommended Frequency & Duration:  2 times/week for up to 8 weeks     Diagnosis:   1. Adhesive capsulitis of left shoulder    2. Adhesive capsulitis of right shoulder    3. Incomplete tear of right rotator cuff          TODAY'S VISIT:          General Information - 19 1004        Session Details    Document Type other (see comments)   Progress Note     Mode of Treatment physical therapy    Patient/Family Observations I feel teribble everything I do has consequences, I did gardening and the next day I felt teribble.  I don't feel it when I do it but the next day I always feel the pain.  I am going to go get another opinion because I do not want to have surgery.  I was really sore after last session and I lost my paper so I have been limited with my exercises.        Time Calculation    Start Time 1000    Stop Time 1100    Time Calculation (min) 60 min              Pain/Vitals - 19 1013        Pain/Comfort/Sleep    Presence of Pain complains of pain/discomfort    Preferred Pain Scale number (Numeric Rating Pain Scale)    Pain Body Location shoulder    Pain Rating (0-10): Pre Activity 0    Pain Rating (0-10): Activity 6    Pain Rating (0-10): Post Activity 0       Pain Intervention    Intervention  Manual techniques and ellectrical stimulation    Post Intervention Comments See Assessment              Daily Falls Screen - 19 1004        Daily Falls Assessment    Patient reported fall since last visit No            OBJECTIVE MEASUREMENTS/DATA:    Eval Assessment          Evaluation Assessment and Plan - 19 1128        Evaluation Assessment and Plan    Plan of Care reviewed and patient/family in agreement Yes    System Pathology/Pathophysiology Noted musculoskeletal     Functional Limitations in Following Categories (PT Eval) self-care;home management;community/leisure    Rehab Potential/Prognosis good, to achieve stated therapy goals    Problem List decreased strength;impaired motor control;pain    Clinical Assessment Nicole is a 69 y/o female wtih right > left shoulder pain for several months that was exacerbated with yoga and daily activity.  Imaging is + for right subscapularis, infraspinatus, and labral degeneration.  She has completed 4 weeks of physical therapy and continues to have right > left shoulder pain with daily activity despite limiting yoga.  Cervical spine demonstrates normal and symmetric ROM with 2+ reflex's and intact sensation.  PROM of right and left shoulder is WNL with noted fear and guarding during overhead motions.  Nicole is able to complete AROM into all planes with right biceps pain during eccentric shoulder flexion and lateral shoulder pain with AROM right shoulder abduction.  Strength is improved since evaluation and she is now able to blowdry her hair pain free.  Education provided for progression of return to yoga in safe positions to avoid WB UE positions due to decreased scapular control and altered WB in quadruped due to left knee pain. Nicole will return to modified yoga and continue with shoulder stabilization activities to return to prior activity pain free. Continue with PT 2x a week x 4 weeks.          Sensory Tests         Sensory Testing - 08/06/19 1032        Sensory    Sensory General Assessment no sensation deficits identified        DTR         Deep Tendon Reflexes - 08/06/19 1036        Deep Tendon Reflexes    Left Biceps Reflex 2-->average, normal    Right Biceps Reflex 2-->average, normal    Left Brachioradial Reflex 2-->average, normal    Right Brachioradial Reflex 2-->average, normal    Left Triceps Reflex 2-->average, normal    Right Triceps Reflex 2-->average, normal        ROM         Range of Motion - 08/06/19 1000        RIGHT:  Upper Extremity PROM Assessment    Shoulder Flexion Deficit 170 degrees    Shoulder Abduction Deficit 150 degrees       LEFT: Upper Extremity PROM Assessment    Shoulder Abduction Deficit 170 degrees       LEFT: Upper Extremity AROM Assessment    Shoulder Flexion Deficit 172 degrees    Shoulder Abduction Deficit 160 degrees    Shoulder Internal Rotation Deficit 62 degrees    Shoulder External Rotation Deficit 103 degrees       RIGHT: Upper Extremity AROM Assessment    Shoulder Flexion Deficit 165 degrees    Shoulder Abduction Deficit 130 degrees    Shoulder Internal Rotation Deficit 65 degrees    Shoulder External Rotation Deficit 96 degrees        MMT         Manual Muscle Tests - 08/06/19 1023        RIGHT: Upper Extremity Manual Muscle Test Assessment    Right UE MMT comments: Hand Held Dynamometry taken supine    Shoulder Flexion gross movement --   8.8#    Shoulder Extension gross movement --   11.5#    Shoulder Abduction gross movement --   8.1# 45 degrees abd    Shoulder Internal Rotation gross movement --   6.5#    Shoulder External Rotation gross movement --   9.2#    Elbow Flex gross movement --   15.4#    Elbow Extension gross movement --   7.7#       LEFT: Upper Extremity Manual Muscle Test Assessment    Left UE MMT comments: Hand Held Dynamometry supine     Shoulder Flexion gross movement --   11.1#    Shoulder Extension gross movement --   5.7#    Shoulder Abduction gross movement --   10#    Shoulder Internal Rotation gross movement --   12.3#    Shoulder External Rotation gross movement --   10#    Elbow Flexion gross movement --   20#    Elbow Extension gross movement --   12#          Today's Treatment::        Precautions Avoid prone and quadruped   Treatment Grp Current Session Time   Modalities Total Time for Session Not performed   Heat/Ice    Vasocompression    Modalities Total Time for Session Not performed   TENS    NMES    HWAVE    Manual  Total Time for Session 8-22 Minutes   STM/MFR/TPR     Instrument Assisted STM     Mobilizations Grade 1/2 GH mobilizations posterior and inferior, long axis distraction   ROM/Flexibility PROM right and left shoulder all planes   Myofascial Decompression    Therapeutic Exercise Total Time for Session 23-37 Minutes    Sets Reps Load Comment                                                    Progress Note   Objective assessment completed        Neuromuscular Re-Education Total Time for Session Not performed    Sets Reps Load Comment                                             Gait Total Time for Session Not performed           Therapeutic Activity Total Time for Session 8-22 Minutes   Patient Education  POC education and review of yoga poses to continue in fitness center       Group Total Time for Session Not performed                               Goals Addressed             Most Recent       Patient Stated    • Patient (pt-stated)   Improving (8/6/2019)             Return to yoga and use my arms without pain            Other    • LTG Shoulder   Improving (8/6/2019)             Long Term Goals Time Frame Result Progress   Pt will increase bilateral UE MMT into flexion, abduction, ER and IR >/= 8# pain free 8  weeks Ongoing    Pt will increase bilatera UE ROM WNL pain free to return to yoga class  8  weeks Ongoing    Lift overhead pain free  8  weeks Ongoing Blow dry pain free   Increase CRISTINA >/= 90% to increase function   8  weeks Ongoing    Become independent with HEP  8  weeks Ongoing    Verbalize understanding of hypermobility and safe exercise  8   weeks Ongoing    Decrease pain at worst </= 2/10  8  weeks Ongoing              • STG Shoulder   Improving (8/6/2019)             Short Term Goals Time Frame Result Progress   Pt will increase bilateral UE MMT into flexion, abduction, ER and IR >/= 5# 4  weeks Met    Pt will increase bilateral UE ROM >/= WNL into flexion, extension to improve functional mobility without pain or fear of pain 4  weeks Ongoing Continued  fear of activity   Complete closed chain protraction and retraction pain free 4  weeks Ongoing    Increase CRISTINA >/= 10 points to increase function   4  weeks Ongoing Initial 55%  8/6/19 47%   Become independent with HEP  4  weeks Met    Decrease pain at worst </= 4/10  4  weeks Ongoing

## 2019-08-20 ENCOUNTER — HOSPITAL ENCOUNTER (OUTPATIENT)
Dept: PHYSICAL THERAPY | Age: 69
Setting detail: THERAPIES SERIES
Discharge: HOME | End: 2019-08-20
Attending: ORTHOPAEDIC SURGERY
Payer: MEDICARE

## 2019-08-20 DIAGNOSIS — M75.02 ADHESIVE CAPSULITIS OF LEFT SHOULDER: Primary | ICD-10-CM

## 2019-08-20 DIAGNOSIS — M75.01 ADHESIVE CAPSULITIS OF RIGHT SHOULDER: ICD-10-CM

## 2019-08-20 DIAGNOSIS — M75.111 INCOMPLETE TEAR OF RIGHT ROTATOR CUFF: ICD-10-CM

## 2019-08-20 PROCEDURE — 97110 THERAPEUTIC EXERCISES: CPT | Mod: GP

## 2019-08-20 NOTE — OP PT TREATMENT LOG
"Precautions Avoid prone and quadruped   Treatment Grp Current Session Time   Modalities Total Time for Session 0-7 Minutes   Heat/Ice CP 6 minutes   Vasocompression    Modalities Total Time for Session Not performed   TENS    NMES    HWAVE    Manual  Total Time for Session Not performed   STM/MFR/TPR    Instrument Assisted STM     Mobilizations    ROM/Flexibility    Myofascial Decompression    Therapeutic Exercise Total Time for Session 38-52 Minutes    Sets Reps Load Comment    Rows  2 10 [place]    Shoulder Extension  2 10 [place]    Shoulder IR  2 10 [place]    Shoulder ER  1 10 [place]    Standing @ wall shoulder 90 deg with ball CW/CCW  1 30     Qped on mat, ball on wall, scapular stabilization with light perturbations  3 20\"     Qped shoulder protraction/retraction  1 10  Frequent cueing    Wall slides  2 10  flexion   Elbow wall slides  2 10     Shoulder scaption  1 10 [place]              Neuromuscular Re-Education Total Time for Session Not performed    Sets Reps Load Comment                                             Gait Total Time for Session Not performed           Therapeutic Activity Total Time for Session Not performed   Patient Education         Group Total Time for Session Not performed                         "

## 2019-08-20 NOTE — PROGRESS NOTES
PT DAILY NOTE FOR OUTPATIENT THERAPY    Patient: Nicole Tong   MRN: [id number]  : 1950 69 y.o.  Referring Physician: Checo Petit MD  Date of Visit: 2019      Certification Dates: 19 through 19    Diagnosis:   1. Adhesive capsulitis of left shoulder    2. Adhesive capsulitis of right shoulder    3. Incomplete tear of right rotator cuff        Chief Complaints:   Chief Complaint   Patient presents with   • Pain       Precautions:        TODAY'S VISIT          General Information - 19 1050        Session Details    Document Type daily treatment    Mode of Treatment physical therapy    Patient/Family Observations Pt reports she was away on vacation and still recoving from the trip. Has not done any yoga yet. Plans on returning to yoga in September.    [facility]       Time Calculation    Start Time 1006    Stop Time 1057    Time Calculation (min) 51 min       General Information    Patient Profile Reviewed? yes              Pain/Vitals - 19 1049        Pain/Comfort/Sleep    Presence of Pain complains of pain/discomfort    Preferred Pain Scale number (Numeric Rating Pain Scale)    Pain Body Location shoulder    Pain Rating (0-10): Pre Activity 4   4/10 R shoulder, 1/10 L shoulder    Pain Rating (0-10): Post Activity 4   4/10 R shoulder, 1/10 L shoulder       Pain Intervention    Intervention  Heather    Post Intervention Comments Pain unchanged.       Pain    Pain Body Location - Side Bilateral              Daily Falls Screen - 19 1053        Daily Falls Assessment    Patient reported fall since last visit No              Daily Treatment Assessment and Plan - 19 1053        Daily Treatment Assessment and Plan    Progress toward goals Slower than expected    Daily Outcome Summary Pt arrived 6 minutes late to scheduled appointment with granddaughter. Modified session 2* to tardiness. Per discussion with evaluating therapist advanced Heather by progressing  "with scapular stabilization and TB. Frequent cueing for technique, sequencing, and redirecting patient. Pt unable to coordinate Qped position with scapular stabilization and protract/retract opposite arm. Created two exercieses to improve technique, will advance performing both at the same time when technique & coordination improves. Ice at end of session for pain managment. Educated patient to ice at home for pain manaagment.      Plan and Recommendations Cont w/POC to reach max functional gains and emphasize on strength & scapular stabilization.           OBJECTIVE DATA TAKEN TODAY:    None taken    Today's Treatment:        Precautions Avoid prone and quadruped   Treatment Grp Current Session Time   Modalities Total Time for Session 0-7 Minutes   Heat/Ice CP 6 minutes   Vasocompression    Modalities Total Time for Session Not performed   TENS    NMES    HWAVE    Manual  Total Time for Session Not performed   STM/MFR/TPR    Instrument Assisted STM     Mobilizations    ROM/Flexibility    Myofascial Decompression    Therapeutic Exercise Total Time for Session 38-52 Minutes    Sets Reps Load Comment    Rows  2 10 [place]    Shoulder Extension  2 10 [place]    Shoulder IR  2 10 [place]    Shoulder ER  1 10 [place]    Standing @ wall shoulder 90 deg with ball CW/CCW  1 30     Qped on mat, ball on wall, scapular stabilization with light perturbations  3 20\"     Qped shoulder protraction/retraction  1 10  Frequent cueing    Wall slides  2 10  flexion   Elbow wall slides  2 10     Shoulder scaption  1 10 [place]              Neuromuscular Re-Education Total Time for Session Not performed    Sets Reps Load Comment                                             Gait Total Time for Session Not performed           Therapeutic Activity Total Time for Session Not performed   Patient Education         Group Total Time for Session Not performed                                     "

## 2019-09-10 ENCOUNTER — HOSPITAL ENCOUNTER (OUTPATIENT)
Dept: PHYSICAL THERAPY | Age: 69
Setting detail: THERAPIES SERIES
Discharge: HOME | End: 2019-09-10
Attending: ORTHOPAEDIC SURGERY
Payer: MEDICARE

## 2019-09-10 DIAGNOSIS — M75.02 ADHESIVE CAPSULITIS OF LEFT SHOULDER: Primary | ICD-10-CM

## 2019-09-10 DIAGNOSIS — M75.01 ADHESIVE CAPSULITIS OF RIGHT SHOULDER: ICD-10-CM

## 2019-09-10 PROCEDURE — 97110 THERAPEUTIC EXERCISES: CPT | Mod: GP

## 2019-09-10 PROCEDURE — 97112 NEUROMUSCULAR REEDUCATION: CPT | Mod: GP

## 2019-09-10 PROCEDURE — 97140 MANUAL THERAPY 1/> REGIONS: CPT | Mod: GP

## 2019-09-10 NOTE — OP PT TREATMENT LOG
Precautions Avoid prone and quadruped   Treatment Grp Current Session Time   Modalities Total Time for Session Not performed   Heat/Ice    Vasocompression    Modalities Total Time for Session Not performed   TENS    NMES    HWAVE    Manual  Total Time for Session 8-22 Minutes   STM/MFR/TPR Pec major/minor/subclavius    Instrument Assisted STM     Mobilizations Grade 1/2 GH mobilizations posterior and inferior, long axis distraction   ROM/Flexibility PROM right and left shoulder all planes   Myofascial Decompression    Therapeutic Exercise Total Time for Session 23-37 Minutes    Sets Reps Load Comment                                                    Progress Note   Objective assessment completed        Neuromuscular Re-Education Total Time for Session 8-22 Minutes    Sets Reps Load Comment                                   KT Taping  Decompression strip at right AC joint, stability taping right and left thoracic paraspinals, postural taping bilateral scapular retraction.          Gait Total Time for Session Not performed           Therapeutic Activity Total Time for Session Not performed           Group Total Time for Session Not performed

## 2019-09-10 NOTE — PROGRESS NOTES
Referring Provider: By co-signing this Plan of Care (POC), you agree with the planned services and interventions recommended by the therapist.       PT RE-EVALUATION FOR OUTPATIENT THERAPY    Patient: Nicole Tong   MRN: [id number]  : 1950 69 y.o.  Referring Physician: Checo Petit MD  Date of Visit: 9/10/2019      New Certification Dates: 09/10/19 through 19    Recommended Frequency & Duration:  2 times/week for up to 8 weeks     Diagnosis:   1. Adhesive capsulitis of left shoulder    2. Adhesive capsulitis of right shoulder        TODAY'S VISIT:          General Information - 09/10/19 1004        Session Details    Document Type re-evaluation    Mode of Treatment physical therapy    Patient/Family Observations My pain has been up and down, it hurts with daily activity like chopping vegtables and ironing.  I have not done any yoga becuase I am still scared.  Still unable to sleep on my right shoulder, I was not able to come to therapy as we had a death in the family and went on vacation.     [facility]       Time Calculation    Start Time 1000    Stop Time 1100    Time Calculation (min) 60 min              Pain/Vitals - 09/10/19 1005        Pain/Comfort/Sleep    Presence of Pain complains of pain/discomfort    Preferred Pain Scale number (Numeric Rating Pain Scale)    Pain Body Location shoulder    Pain Rating (0-10): Pre Activity 0    Pain Rating (0-10): Activity 6    Pain Rating (0-10): Post Activity 0       Pain Intervention    Intervention  Manual techniques and taping    Post Intervention Comments See assessment       Pain    Pain Body Location - Side Bilateral              Daily Falls Screen - 09/10/19 1008        Daily Falls Assessment    Patient reported fall since last visit No            OBJECTIVE MEASUREMENTS/DATA:    Eval Assessment          Evaluation Assessment and Plan - 09/10/19 1145        Evaluation Assessment and Plan    Plan of Care reviewed and patient/family  in agreement Yes    System Pathology/Pathophysiology Noted musculoskeletal    Functional Limitations in Following Categories (PT Eval) self-care;home management;community/leisure    Rehab Potential/Prognosis good, to achieve stated therapy goals    Problem List decreased strength;impaired motor control;pain    Clinical Assessment Nicole is a 69 y/o female with a history of right > left shoulder pain for several moths that was exacerbated with yoga and daily activity.  Imaging studies are + for right subscapularis, infraspinatus tear, and labral degenerative.  She has completed 8 weeks of physical therapy and has improved AROM and strength however has not returned to yoga or full daily activity at this time.  Nicole was unable to attend therapy x 3 weeks due to a death in her family and vacation, she was compliant with her HEP and demonstrates increased strength since last assessment.  Nicole continues to demonstrate right shoulder impingement symptoms and reactivity to right > left shoulder end range mobility.  Nicole will continue to benefit from physical therapy to improve thoracic mobility and scapular positioning, desensitize right shoulder end range mobiltiy, decerase fear avoidance behaviors, and return to yoga practice.  Continue with PT 2x aweek x 8 weeks to reach remaining goals.      Planned Services CPT 13060 Neuromuscular Reeducation;CPT 14186 Therpeutic exercises;CPT 00485 Therapeutic activities;CPT 77862 Manual therapy;CPT 20935 Hot/Cold Packs therapy        Sensory Tests         Sensory Testing - 09/10/19 1008        Sensory    Sensory General Assessment no sensation deficits identified        DTR         Deep Tendon Reflexes - 09/10/19 1009        Deep Tendon Reflexes    Left Biceps Reflex 2-->average, normal    Right Biceps Reflex 2-->average, normal    Left Brachioradial Reflex 2-->average, normal    Right Brachioradial Reflex 2-->average, normal    Left Triceps Reflex 2-->average, normal    Right  Triceps Reflex 2-->average, normal        ROM         Range of Motion - 09/10/19 1000        RIGHT: Upper Extremity PROM Assessment    Shoulder Abduction Deficit 140 degrees       LEFT: Upper Extremity AROM Assessment    Shoulder Flexion Deficit 172 degrees    Shoulder Abduction Deficit 160 degrees    Shoulder Internal Rotation Deficit 65 degrees    Shoulder External Rotation Deficit 106 degrees       RIGHT: Upper Extremity AROM Assessment    Shoulder Flexion Deficit 164 degrees    Shoulder Abduction Deficit 130 degrees    Shoulder Internal Rotation Deficit 57 degrees    Shoulder External Rotation Deficit 101 degrees        MMT         Manual Muscle Tests - 09/10/19 1016        RIGHT: Upper Extremity Manual Muscle Test Assessment    Right UE MMT comments: Hand Held Dynamometry Supine    Shoulder Flexion gross movement --   10.4#    Shoulder Extension gross movement --   14.2#    Shoulder Abduction gross movement --   9.2#    Shoulder Internal Rotation gross movement --   6.1#    Shoulder External Rotation gross movement --   8.7#    Elbow Flex gross movement --   13.8#    Elbow Extension gross movement --   9.6#       LEFT: Upper Extremity Manual Muscle Test Assessment    Left UE MMT comments: Hand Held Dynamometry Supine    Shoulder Flexion gross movement --   14.2#    Shoulder Extension gross movement --   10.4#    Shoulder Abduction gross movement --   14.2#    Shoulder Internal Rotation gross movement --   11.5#    Shoulder External Rotation gross movement --   10.4#    Elbow Flexion gross movement --   21#    Elbow Extension gross movement --   10#        Posture         Posture - 09/10/19 1141        Postural Deviations    Shoulder left shoulder forward;right shoulder forward;right shoulder medial rotation;left shoulder medial rotation          Today's Treatment::        Precautions Avoid prone and quadruped   Treatment Grp Current Session Time   Modalities Total Time for Session Not performed   Heat/Ice     Vasocompression    Modalities Total Time for Session Not performed   TENS    NMES    HWAVE    Manual  Total Time for Session 8-22 Minutes   STM/MFR/TPR Pec major/minor/subclavius    Instrument Assisted STM     Mobilizations Grade 1/2 GH mobilizations posterior and inferior, long axis distraction   ROM/Flexibility PROM right and left shoulder all planes   Myofascial Decompression    Therapeutic Exercise Total Time for Session 23-37 Minutes    Sets Reps Load Comment                                                    Progress Note   Objective assessment completed        Neuromuscular Re-Education Total Time for Session 8-22 Minutes    Sets Reps Load Comment                                   KT Taping  Decompression strip at right AC joint, stability taping right and left thoracic paraspinals, postural taping bilateral scapular retraction.     Education  Education on soft tissue mobility for left and right chest musculature and updated HEP with completion and no pain    Gait Total Time for Session Not performed           Therapeutic Activity Total Time for Session Not performed           Group Total Time for Session Not performed                               Goals:  Goals        Patient Stated    • Patient (pt-stated)            Return to yoga and use my arms without pain            Other    • LTG Shoulder            Long Term Goals Time Frame Result Progress   Pt will increase bilateral UE MMT into flexion, abduction, ER and IR >/= 8# pain free 8  weeks Ongoing    Pt will increase bilatera UE ROM WNL pain free to return to yoga class  8  weeks Ongoing    Lift overhead pain free  8  weeks Met    Increase CRISTINA >/= 90% to increase function   8  weeks Ongoing 9/10/19  61%   Become independent with HEP  8  weeks Met    Verbalize understanding of hypermobility and safe exercise  8   weeks Met    Decrease pain at worst </= 2/10  8  weeks Ongoing              • STG Shoulder            Short Term Goals Time Frame Result  Progress   Pt will increase bilateral UE MMT into flexion, abduction, ER and IR >/= 5# 4  weeks Met    Pt will increase bilateral UE ROM >/= WNL into flexion, extension to improve functional mobility without pain or fear of pain 4  weeks Met    Complete closed chain protraction and retraction pain free 4  weeks Met    Increase CRISTINA >/= 10 points to increase function   4  weeks Ongoing Initial 55%  8/6/19 47%  9/10/19  61%   Become independent with HEP  4  weeks Met    Decrease pain at worst </= 4/10  4  weeks Met                      This 69 y.o. year old female presents to PT with above stated diagnosis. Physical Therapy evaluation reveals decreased strength, impaired motor control, pain resulting in self-care, home management, community/leisure limitations. Nicole Tong will benefit from skilled PT services to address limitation, work towards rehab and patient goals and maximize PLOF of chosen ADLs.     Planned Services: The patient's treatment will include CPT 49088 Neuromuscular Reeducation, CPT 27571 Therpeutic exercises, CPT 16188 Therapeutic activities, CPT 24820 Manual therapy, CPT 59596 Hot/Cold Packs therapy, .

## 2019-09-10 NOTE — LETTER
[address]  [place] [address]  [facility] Fax: [phone]    PHYSICAL THERAPY PLAN OF CARE    Patient Name: Nicole Tong    Certification Dates:  From 09/10/19  To: 19  Frequency: 2 times/week Duration: 8 weeks  Other:      Provider: Benito Espinal PT   Referring Provider: Checo Petit MD  PCP: Nicole Ibrahim MD      Payor: Payor: MEDICARE / Plan: MEDICARE PART A & B / Product Type: Medicare /   Medical Diagnosis: Adhesive capsulitis of left shoulder [M75.02]     Rehab Potential: good, to achieve stated therapy goals    Planned Services: The patient's treatment will include CPT 37527 Neuromuscular Reeducation, CPT 95346 Therpeutic exercises, CPT 35982 Therapeutic activities, CPT 02007 Manual therapy, CPT 52270 Hot/Cold Packs therapy, .     By signing this plan of care, I certify this plan of care as correct and necessary for the patient.        Physician Signature: _________________________________________ Date: _________________    Thank you for this referral. Please contact our department with any questions.      Benito Espinal PT      Referring Provider: By co-signing this Plan of Care (POC), you agree with the planned services and interventions recommended by the therapist.       PT RE-EVALUATION FOR OUTPATIENT THERAPY    Patient: Nicole Tong   MRN: [id number]  : 1950 69 y.o.  Referring Physician: Checo Petit MD  Date of Visit: 9/10/2019      New Certification Dates: 09/10/19 through 19    Recommended Frequency & Duration:  2 times/week for up to 8 weeks     Diagnosis:   1. Adhesive capsulitis of left shoulder    2. Adhesive capsulitis of right shoulder        TODAY'S VISIT:          General Information - 09/10/19 1004        Session Details    Document Type re-evaluation    Mode of Treatment physical therapy    Patient/Family Observations My pain has been up and down, it hurts with daily activity like chopping vegtables and ironing.  I have not done any  yoga becuase I am still scared.  Still unable to sleep on my right shoulder, I was not able to come to therapy as we had a death in the family and went on vacation.     [facility]       Time Calculation    Start Time 1000    Stop Time 1100    Time Calculation (min) 60 min              Pain/Vitals - 09/10/19 1005        Pain/Comfort/Sleep    Presence of Pain complains of pain/discomfort    Preferred Pain Scale number (Numeric Rating Pain Scale)    Pain Body Location shoulder    Pain Rating (0-10): Pre Activity 0    Pain Rating (0-10): Activity 6    Pain Rating (0-10): Post Activity 0       Pain Intervention    Intervention  Manual techniques and taping    Post Intervention Comments See assessment       Pain    Pain Body Location - Side Bilateral              Daily Falls Screen - 09/10/19 1008        Daily Falls Assessment    Patient reported fall since last visit No            OBJECTIVE MEASUREMENTS/DATA:    Eval Assessment          Evaluation Assessment and Plan - 09/10/19 1145        Evaluation Assessment and Plan    Plan of Care reviewed and patient/family in agreement Yes    System Pathology/Pathophysiology Noted musculoskeletal    Functional Limitations in Following Categories (PT Eval) self-care;home management;community/leisure    Rehab Potential/Prognosis good, to achieve stated therapy goals    Problem List decreased strength;impaired motor control;pain    Clinical Assessment Nicole is a 67 y/o female with a history of right > left shoulder pain for several moths that was exacerbated with yoga and daily activity.  Imaging studies are + for right subscapularis, infraspinatus tear, and labral degenerative.  She has completed 8 weeks of physical therapy and has improved AROM and strength however has not returned to yoga or full daily activity at this time.  Nicole was unable to attend therapy x 3 weeks due to a death in her family and vacation, she was compliant with her HEP and demonstrates  increased strength since last assessment.  Nicole continues to demonstrate right shoulder impingement symptoms and reactivity to right > left shoulder end range mobility.  Nicole will continue to benefit from physical therapy to improve thoracic mobility and scapular positioning, desensitize right shoulder end range mobiltiy, decerase fear avoidance behaviors, and return to yoga practice.  Continue with PT 2x aweek x 8 weeks to reach remaining goals.      Planned Services CPT 53927 Neuromuscular Reeducation;CPT 74932 Therpeutic exercises;CPT 87765 Therapeutic activities;CPT 01159 Manual therapy;CPT 78075 Hot/Cold Packs therapy        Sensory Tests         Sensory Testing - 09/10/19 1008        Sensory    Sensory General Assessment no sensation deficits identified        DTR         Deep Tendon Reflexes - 09/10/19 1009        Deep Tendon Reflexes    Left Biceps Reflex 2-->average, normal    Right Biceps Reflex 2-->average, normal    Left Brachioradial Reflex 2-->average, normal    Right Brachioradial Reflex 2-->average, normal    Left Triceps Reflex 2-->average, normal    Right Triceps Reflex 2-->average, normal        ROM         Range of Motion - 09/10/19 1000        RIGHT: Upper Extremity PROM Assessment    Shoulder Abduction Deficit 140 degrees       LEFT: Upper Extremity AROM Assessment    Shoulder Flexion Deficit 172 degrees    Shoulder Abduction Deficit 160 degrees    Shoulder Internal Rotation Deficit 65 degrees    Shoulder External Rotation Deficit 106 degrees       RIGHT: Upper Extremity AROM Assessment    Shoulder Flexion Deficit 164 degrees    Shoulder Abduction Deficit 130 degrees    Shoulder Internal Rotation Deficit 57 degrees    Shoulder External Rotation Deficit 101 degrees        MMT         Manual Muscle Tests - 09/10/19 1016        RIGHT: Upper Extremity Manual Muscle Test Assessment    Right UE MMT comments: Hand Held Dynamometry Supine    Shoulder Flexion gross movement --   10.4#    Shoulder  Extension gross movement --   14.2#    Shoulder Abduction gross movement --   9.2#    Shoulder Internal Rotation gross movement --   6.1#    Shoulder External Rotation gross movement --   8.7#    Elbow Flex gross movement --   13.8#    Elbow Extension gross movement --   9.6#       LEFT: Upper Extremity Manual Muscle Test Assessment    Left UE MMT comments: Hand Held Dynamometry Supine    Shoulder Flexion gross movement --   14.2#    Shoulder Extension gross movement --   10.4#    Shoulder Abduction gross movement --   14.2#    Shoulder Internal Rotation gross movement --   11.5#    Shoulder External Rotation gross movement --   10.4#    Elbow Flexion gross movement --   21#    Elbow Extension gross movement --   10#        Posture         Posture - 09/10/19 1141        Postural Deviations    Shoulder left shoulder forward;right shoulder forward;right shoulder medial rotation;left shoulder medial rotation          Today's Treatment::        Precautions Avoid prone and quadruped   Treatment Grp Current Session Time   Modalities Total Time for Session Not performed   Heat/Ice    Vasocompression    Modalities Total Time for Session Not performed   TENS    NMES    HWAVE    Manual  Total Time for Session 8-22 Minutes   STM/MFR/TPR Pec major/minor/subclavius    Instrument Assisted STM     Mobilizations Grade 1/2 GH mobilizations posterior and inferior, long axis distraction   ROM/Flexibility PROM right and left shoulder all planes   Myofascial Decompression    Therapeutic Exercise Total Time for Session 23-37 Minutes    Sets Reps Load Comment                                                    Progress Note   Objective assessment completed        Neuromuscular Re-Education Total Time for Session 8-22 Minutes    Sets Reps Load Comment                                   KT Taping  Decompression strip at right AC joint, stability taping right and left thoracic paraspinals, postural taping bilateral scapular retraction.      Education  Education on soft tissue mobility for left and right chest musculature and updated HEP with completion and no pain    Gait Total Time for Session Not performed           Therapeutic Activity Total Time for Session Not performed           Group Total Time for Session Not performed                               Goals:  Goals        Patient Stated    • Patient (pt-stated)            Return to yoga and use my arms without pain            Other    • LTG Shoulder            Long Term Goals Time Frame Result Progress   Pt will increase bilateral UE MMT into flexion, abduction, ER and IR >/= 8# pain free 8  weeks Ongoing    Pt will increase bilatera UE ROM WNL pain free to return to yoga class  8  weeks Ongoing    Lift overhead pain free  8  weeks Met    Increase CRISTINA >/= 90% to increase function   8  weeks Ongoing 9/10/19  61%   Become independent with HEP  8  weeks Met    Verbalize understanding of hypermobility and safe exercise  8   weeks Met    Decrease pain at worst </= 2/10  8  weeks Ongoing              • STG Shoulder            Short Term Goals Time Frame Result Progress   Pt will increase bilateral UE MMT into flexion, abduction, ER and IR >/= 5# 4  weeks Met    Pt will increase bilateral UE ROM >/= WNL into flexion, extension to improve functional mobility without pain or fear of pain 4  weeks Met    Complete closed chain protraction and retraction pain free 4  weeks Met    Increase CRISTINA >/= 10 points to increase function   4  weeks Ongoing Initial 55%  8/6/19 47%  9/10/19  61%   Become independent with HEP  4  weeks Met    Decrease pain at worst </= 4/10  4  weeks Met                      This 69 y.o. year old female presents to PT with above stated diagnosis. Physical Therapy evaluation reveals decreased strength, impaired motor control, pain resulting in self-care, home management, community/leisure limitations. Nicole Tong will benefit from skilled PT services to address limitation, work  towards rehab and patient goals and maximize PLOF of chosen ADLs.     Planned Services: The patient's treatment will include CPT 05148 Neuromuscular Reeducation, CPT 84359 Therpeutic exercises, CPT 20839 Therapeutic activities, CPT 68562 Manual therapy, CPT 82271 Hot/Cold Packs therapy, .

## 2019-09-10 NOTE — OP PT TREATMENT LOG
Precautions Avoid prone and quadruped   Treatment Grp Current Session Time   Modalities Total Time for Session Not performed   Heat/Ice    Vasocompression    Modalities Total Time for Session Not performed   TENS    NMES    HWAVE    Manual  Total Time for Session 8-22 Minutes   STM/MFR/TPR Pec major/minor/subclavius    Instrument Assisted STM     Mobilizations Grade 1/2 GH mobilizations posterior and inferior, long axis distraction   ROM/Flexibility PROM right and left shoulder all planes   Myofascial Decompression    Therapeutic Exercise Total Time for Session 23-37 Minutes    Sets Reps Load Comment                                                    Progress Note   Objective assessment completed        Neuromuscular Re-Education Total Time for Session 8-22 Minutes    Sets Reps Load Comment                                   KT Taping  Decompression strip at right AC joint, stability taping right and left thoracic paraspinals, postural taping bilateral scapular retraction.     Education  Education on soft tissue mobility for left and right chest musculature and updated HEP with completion and no pain    Gait Total Time for Session Not performed           Therapeutic Activity Total Time for Session Not performed           Group Total Time for Session Not performed

## 2019-09-16 ENCOUNTER — HOSPITAL ENCOUNTER (OUTPATIENT)
Dept: PHYSICAL THERAPY | Age: 69
Setting detail: THERAPIES SERIES
Discharge: HOME | End: 2019-09-16
Attending: ORTHOPAEDIC SURGERY
Payer: MEDICARE

## 2019-09-16 DIAGNOSIS — M75.02 ADHESIVE CAPSULITIS OF LEFT SHOULDER: Primary | ICD-10-CM

## 2019-09-16 PROCEDURE — 97140 MANUAL THERAPY 1/> REGIONS: CPT | Mod: GP

## 2019-09-16 PROCEDURE — 97110 THERAPEUTIC EXERCISES: CPT | Mod: GP

## 2019-09-16 PROCEDURE — 97112 NEUROMUSCULAR REEDUCATION: CPT | Mod: GP

## 2019-09-16 NOTE — OP PT TREATMENT LOG
Precautions Avoid prone and quadruped   Treatment Grp Current Session Time   Modalities Total Time for Session Not performed   Heat/Ice    Vasocompression    Modalities Total Time for Session Not performed   TENS    NMES    HWAVE    Manual  Total Time for Session 8-22 Minutes   STM/MFR/TPR Pec major/minor   Instrument Assisted STM     Mobilizations    ROM/Flexibility PROM right and left shoulder all planes   Myofascial Decompression    Therapeutic Exercise Total Time for Session 23-37 Minutes    Sets Reps Load Comment    Rows  2 10 yellow    Shoulder Extension  2 10 yellow    Shoulder IR/ER    10 yellow R IR + pain   Seated Shoulder Flexion/ Abduction  2 10 1# L + pain in R with weight                                                     Neuromuscular Re-Education Total Time for Session 8-22 Minutes    Sets Reps Load Comment   Rhythmic Stabilization B/L   3  Supine                                     Gait Total Time for Session Not performed           Therapeutic Activity Total Time for Session Not performed           Group Total Time for Session Not performed

## 2019-09-16 NOTE — OP PT TREATMENT LOG
Precautions Avoid prone and quadruped   Treatment Grp Current Session Time   Modalities Total Time for Session Not performed   Heat/Ice    Vasocompression    Modalities Total Time for Session Not performed   TENS    NMES    HWAVE    Manual  Total Time for Session 8-22 Minutes   STM/MFR/TPR Pec major/minor   Instrument Assisted STM     Mobilizations    ROM/Flexibility PROM right and left shoulder all planes   Myofascial Decompression    Therapeutic Exercise Total Time for Session 23-37 Minutes    Sets Reps Load Comment    Rows  2 10     Shoulder Extension  2 10     Shoulder IR/ER    10  R IR + pain   Seated Shoulder Flexion/ Abduction  2 10 1# L + pain in R with weight                                                     Neuromuscular Re-Education Total Time for Session 8-22 Minutes    Sets Reps Load Comment   Rhythmic Stabilization B/L   3  Supine                                     Gait Total Time for Session Not performed           Therapeutic Activity Total Time for Session Not performed           Group Total Time for Session Not performed

## 2019-09-16 NOTE — PROGRESS NOTES
PT DAILY NOTE FOR OUTPATIENT THERAPY    Patient: Nicole Tong   MRN: [id number]  : 1950 69 y.o.  Referring Physician: Checo Petit MD  Date of Visit: 2019      Certification Dates: 09/10/19 through 19    Diagnosis:   1. Adhesive capsulitis of left shoulder        TODAY'S VISIT          General Information - 19 0907        Session Details    Document Type daily treatment    Mode of Treatment physical therapy    Patient/Family Observations I felt good after the previous session. Sleeping bothers it the most. I sleep on my side on both of my shoulders. I was sore after last session. Complaint with HEP as as much as i can.        Time Calculation    Start Time 0905    Stop Time 1000    Time Calculation (min) 55 min              Pain/Vitals - 19 0907        Pain/Comfort/Sleep    Presence of Pain complains of pain/discomfort    Preferred Pain Scale number (Numeric Rating Pain Scale)    Pain Body Location shoulder    Pain Rating (0-10): Pre Activity 0    Pain Rating (0-10): Activity 3       Pain Intervention    Intervention  Manual    Post Intervention Comments See Assessment               Daily Falls Screen - 19 0910        Daily Falls Assessment    Patient reported fall since last visit No              Daily Treatment Assessment and Plan - 19 0959        Daily Treatment Assessment and Plan    Progress toward goals Slower than expected    Daily Outcome Summary Arrived 5 min late. Continued with POC. PT RA instructed to advance exercises this session, pending how patient feels. Started with STM to pec muscles this session. Presents with tightness in this area and tended to touch. Demonstrates good ROM in all planes. Slightly limited in IR B/L. VC for form with all exercises to ensure proper scapular positioning. Minimal pain noted with R IR banded exercise as well as with weight for shoulder Flexion and Abduction. Ended session with CP to help prevent soreness and  swelling. Post pain 2/10.           Today's Treatment:        Precautions Avoid prone and quadruped   Treatment Grp Current Session Time   Modalities Total Time for Session Not performed   Heat/Ice    Vasocompression    Modalities Total Time for Session Not performed   TENS    NMES    HWAVE    Manual  Total Time for Session 8-22 Minutes   STM/MFR/TPR Pec major/minor   Instrument Assisted STM     Mobilizations    ROM/Flexibility PROM right and left shoulder all planes   Myofascial Decompression    Therapeutic Exercise Total Time for Session 23-37 Minutes    Sets Reps Load Comment    Rows  2 10     Shoulder Extension  2 10     Shoulder IR/ER    10  R IR + pain   Seated Shoulder Flexion/ Abduction  2 10 1# L + pain in R with weight                                                     Neuromuscular Re-Education Total Time for Session 8-22 Minutes    Sets Reps Load Comment   Rhythmic Stabilization B/L   3  Supine                                     Gait Total Time for Session Not performed           Therapeutic Activity Total Time for Session Not performed           Group Total Time for Session Not performed

## 2019-09-24 ENCOUNTER — HOSPITAL ENCOUNTER (OUTPATIENT)
Dept: PHYSICAL THERAPY | Age: 69
Setting detail: THERAPIES SERIES
Discharge: HOME | End: 2019-09-24
Attending: ORTHOPAEDIC SURGERY
Payer: MEDICARE

## 2019-09-24 DIAGNOSIS — M75.02 ADHESIVE CAPSULITIS OF LEFT SHOULDER: Primary | ICD-10-CM

## 2019-09-24 PROCEDURE — 97110 THERAPEUTIC EXERCISES: CPT | Mod: GP

## 2019-09-24 PROCEDURE — 97140 MANUAL THERAPY 1/> REGIONS: CPT | Mod: GP

## 2019-09-24 NOTE — OP PT TREATMENT LOG
Precautions Avoid prone and quadruped   Treatment Grp Current Session Time   Modalities Total Time for Session Not performed   Heat/Ice    Vasocompression    Modalities Total Time for Session Not performed   TENS    NMES    HWAVE    Manual  Total Time for Session 23-37 Minutes   STM/MFR/TPR Pec major/minor. Supine in flexed and scaption.    Instrument Assisted STM     Mobilizations    ROM/Flexibility PROM right and left shoulder all planes   Myofascial Decompression    Therapeutic Exercise Total Time for Session 23-37 Minutes    Sets Reps Load Comment    Rows  2 15 yellow    Shoulder Extension  2 15 yellow    Shoulder IR/ER    15 yellow    Seated Shoulder Flexion/ Abduction & Scaption   2 10 1#  + pain with abduction on R (removed 1#)   To 90 deg                                                      Neuromuscular Re-Education Total Time for Session 0-7 Minutes    Sets Reps Load Comment   Rhythmic Stabilization B/L   3  Supine                                     Gait Total Time for Session Not performed           Therapeutic Activity Total Time for Session Not performed           Group Total Time for Session Not performed

## 2019-09-24 NOTE — PROGRESS NOTES
PT DAILY NOTE FOR OUTPATIENT THERAPY    Patient: Nicole Tong   MRN: [id number]  : 1950 69 y.o.  Referring Physician: Checo Petit MD  Date of Visit: 2019      Certification Dates: 09/10/19 through 19    Diagnosis:   1. Adhesive capsulitis of left shoulder        TODAY'S VISIT          General Information - 19 1203        Session Details    Document Type daily treatment    Mode of Treatment physical therapy    Patient/Family Observations I am frazzled i am planning a parade and i will feel better when its over. I do have some pain in my shoulder today. Compliant with HEP. I did not do yoga yet.        Time Calculation    Start Time 1200    Stop Time 1300    Time Calculation (min) 60 min       General Information    Patient Profile Reviewed? yes              Pain/Vitals - 19 1201        Pain/Comfort/Sleep    Presence of Pain complains of pain/discomfort    Preferred Pain Scale number (Numeric Rating Pain Scale)    Pain Body Location shoulder    Pain Rating (0-10): Pre Activity 0    Pain Rating (0-10): Activity 3       Pain Intervention    Intervention  Manual    Post Intervention Comments See Assessment              Daily Falls Screen - 19 1204        Daily Falls Assessment    Patient reported fall since last visit No              Daily Treatment Assessment and Plan - 19 1244        Daily Treatment Assessment and Plan    Progress toward goals Slower than expected    Daily Outcome Summary Nicole arrived to PT feeling frazzled due to planning a parade event. 3/10 pain in her right shoulder. Continues to present with tightness in pectoral muscles. Increased reps this session for certian exercises. Was able to complete banded IR on right this session as she was having pain the last. Also was anble to tolerate FLexion and scaption to 90deg of shoudler flexion and 1# weight. Did have pain with abduction to 90deg and 1# on RUE. She however was able to complete abduction on  the right without weight. Would benefit from continued PT.           Today's Treatment:        Precautions Avoid prone and quadruped   Treatment Grp Current Session Time   Modalities Total Time for Session Not performed   Heat/Ice    Vasocompression    Modalities Total Time for Session Not performed   TENS    NMES    HWAVE    Manual  Total Time for Session 23-37 Minutes   STM/MFR/TPR Pec major/minor. Supine in flexed and scaption.    Instrument Assisted STM     Mobilizations    ROM/Flexibility PROM right and left shoulder all planes   Myofascial Decompression    Therapeutic Exercise Total Time for Session 23-37 Minutes    Sets Reps Load Comment    Rows  2 15 yellow    Shoulder Extension  2 15 yellow    Shoulder IR/ER    15 yellow    Seated Shoulder Flexion/ Abduction & Scaption   2 10 1#  + pain with abduction on R (removed 1#)   To 90 deg                                                      Neuromuscular Re-Education Total Time for Session 0-7 Minutes    Sets Reps Load Comment   Rhythmic Stabilization B/L   3  Supine                                     Gait Total Time for Session Not performed           Therapeutic Activity Total Time for Session Not performed           Group Total Time for Session Not performed

## 2019-09-30 ENCOUNTER — HOSPITAL ENCOUNTER (OUTPATIENT)
Dept: PHYSICAL THERAPY | Age: 69
Setting detail: THERAPIES SERIES
Discharge: HOME | End: 2019-09-30
Attending: ORTHOPAEDIC SURGERY
Payer: MEDICARE

## 2019-09-30 DIAGNOSIS — M75.01 ADHESIVE CAPSULITIS OF RIGHT SHOULDER: ICD-10-CM

## 2019-09-30 DIAGNOSIS — M75.02 ADHESIVE CAPSULITIS OF LEFT SHOULDER: Primary | ICD-10-CM

## 2019-09-30 DIAGNOSIS — M75.111 INCOMPLETE TEAR OF RIGHT ROTATOR CUFF: ICD-10-CM

## 2019-09-30 PROCEDURE — 97110 THERAPEUTIC EXERCISES: CPT | Mod: GP

## 2019-09-30 PROCEDURE — 97140 MANUAL THERAPY 1/> REGIONS: CPT | Mod: GP

## 2019-09-30 PROCEDURE — 97112 NEUROMUSCULAR REEDUCATION: CPT | Mod: GP

## 2019-09-30 NOTE — OP PT TREATMENT LOG
Precautions Avoid prone and quadruped   Treatment Grp Current Session Time   Modalities Total Time for Session Not performed   Heat/Ice    Vasocompression    Modalities Total Time for Session Not performed   TENS    NMES    HWAVE    Manual  Total Time for Session 8-22 Minutes   STM/MFR/TPR Subclavius and pec minor, biceps   Instrument Assisted STM     Mobilizations    ROM/Flexibility PROM all planes pain free without restriction    Myofascial Decompression    Therapeutic Exercise Total Time for Session 23-37 Minutes    Sets Reps Load Comment    Rows  2 8 2 pl     Shoulder Extension  2 8 2 pl    Shoulder IR  2 15 yellow Crossbody    Shoulder ER   2 15 yellow Crossbody, pain at end range, completed in modified ROM    Shoulder Flexion    15 1# Back to wall    Shoulder Scaption     15 1# Back to wall    Shoulder Flexion    15 Orange Back to wall, 120 degrees                     Neuromuscular Re-Education Total Time for Session 8-22 Minutes    Sets Reps Load Comment   Alternating Isometrics   3  Supine, 90 degrees flexion    IR/ER PRE  2 15  90 degrees abduction, supine    S/L ER PRE   3  MC for scap control    S/L Scap Depression PRE   3  Long axis vibration through hand             Gait Total Time for Session Not performed           Therapeutic Activity Total Time for Session Not performed           Group Total Time for Session Not performed

## 2019-09-30 NOTE — PROGRESS NOTES
PT DAILY NOTE FOR OUTPATIENT THERAPY    Patient: Nicole Tong   MRN: [id number]  : 1950 69 y.o.  Referring Physician: Checo Petit MD  Date of Visit: 2019      Certification Dates: 09/10/19 through 19    Diagnosis:   1. Adhesive capsulitis of left shoulder    2. Adhesive capsulitis of right shoulder    3. Incomplete tear of right rotator cuff            TODAY'S VISIT          General Information - 19 1003        Session Details    Document Type daily treatment    Mode of Treatment physical therapy    Patient/Family Observations I am sore from having a busy weekend, othewise I am happy. I still have pain at night, lifting, and reachign behind me.     [facility]       Time Calculation    Start Time 1000    Stop Time 1100    Time Calculation (min) 60 min              Pain/Vitals - 19 1004        Pain/Comfort/Sleep    Presence of Pain complains of pain/discomfort    Preferred Pain Scale number (Numeric Rating Pain Scale)    Pain Body Location shoulder    Pain Rating (0-10): Pre Activity 0    Pain Rating (0-10): Activity 4    Pain Rating (0-10): Post Activity 0       Pain Intervention    Intervention  Manual     Post Intervention Comments See assessment              Daily Falls Screen - 19 1006        Daily Falls Assessment    Patient reported fall since last visit No              Daily Treatment Assessment and Plan - 19 1057        Daily Treatment Assessment and Plan    Progress toward goals Slower than expected    Daily Outcome Summary Iza was educated this session on the effect of mobility and stability in the GH joint.  She presents this session with pain free PROM into all planes without restriction, AROM remains painfull at end range flexion and ER with full ROM achieved.  All exercises completed in pain free range with limited range into both flexion and ER.  Nicole was encouraged to increase completion of home programming and return to independent  exercise as she has been non-compliant and has not returned to prior activity as she states she does not have time to do so.     Plan and Recommendations Progress strength and shoulder stability, STM as needed for symptom management.           Today's Treatment:        Precautions Avoid prone and quadruped   Treatment Grp Current Session Time   Modalities Total Time for Session Not performed   Heat/Ice    Vasocompression    Modalities Total Time for Session Not performed   TENS    NMES    HWAVE    Manual  Total Time for Session 8-22 Minutes   STM/MFR/TPR Subclavius and pec minor, biceps   Instrument Assisted STM     Mobilizations    ROM/Flexibility PROM all planes pain free without restriction    Myofascial Decompression    Therapeutic Exercise Total Time for Session 23-37 Minutes    Sets Reps Load Comment    Rows  2 8 2 pl     Shoulder Extension  2 8 2 pl    Shoulder IR  2 15 yellow Crossbody    Shoulder ER   2 15 yellow Crossbody, pain at end range, completed in modified ROM    Shoulder Flexion    15 1# Back to wall    Shoulder Scaption     15 1# Back to wall    Shoulder Flexion    15 Orange Back to wall, 120 degrees                     Neuromuscular Re-Education Total Time for Session 8-22 Minutes    Sets Reps Load Comment   Alternating Isometrics   3  Supine, 90 degrees flexion    IR/ER PRE  2 15  90 degrees abduction, supine    S/L ER PRE   3  MC for scap control    S/L Scap Depression PRE   3  Long axis vibration through hand             Gait Total Time for Session Not performed           Therapeutic Activity Total Time for Session Not performed           Group Total Time for Session Not performed

## 2019-10-02 ENCOUNTER — HOSPITAL ENCOUNTER (OUTPATIENT)
Dept: PHYSICAL THERAPY | Age: 69
Setting detail: THERAPIES SERIES
Discharge: HOME | End: 2019-10-02
Attending: ORTHOPAEDIC SURGERY
Payer: MEDICARE

## 2019-10-02 DIAGNOSIS — M75.02 ADHESIVE CAPSULITIS OF LEFT SHOULDER: Primary | ICD-10-CM

## 2019-10-02 PROCEDURE — 97140 MANUAL THERAPY 1/> REGIONS: CPT | Mod: GP,KX

## 2019-10-02 PROCEDURE — 97112 NEUROMUSCULAR REEDUCATION: CPT | Mod: GP,KX

## 2019-10-02 PROCEDURE — 97110 THERAPEUTIC EXERCISES: CPT | Mod: GP

## 2019-10-02 NOTE — OP PT TREATMENT LOG
Precautions Avoid prone and quadruped   Treatment Grp Current Session Time   Modalities Total Time for Session Not performed   Heat/Ice    Vasocompression    Modalities Total Time for Session Not performed   TENS    NMES    HWAVE    Manual  Total Time for Session 8-22 Minutes   STM/MFR/TPR Subclavius and pec minor, biceps   Instrument Assisted STM     Mobilizations    ROM/Flexibility PROM all planes pain free without restriction    Myofascial Decompression    Therapeutic Exercise Total Time for Session 23-37 Minutes    Sets Reps Load Comment    Rows  2 10 2 pl     Shoulder Extension  2 10 2 pl    Shoulder IR  2 15 yellow Crossbody    Shoulder ER   2 15 yellow Crossbody, pain at end range, completed in modified ROM    Shoulder Flexion    15 1# Back to wall    Shoulder Scaption     15 1# Back to wall    Shoulder Flexion    15 Orange Back to wall, 120 degrees                     Neuromuscular Re-Education Total Time for Session 8-22 Minutes    Sets Reps Load Comment   Alternating Isometrics   3  Supine, 90 degrees flexion    IR/ER PRE  2 15  90 degrees abduction, supine    S/L ER PRE   3  MC for scap control    S/L Scap Depression PRE   3  Long axis vibration through hand             Gait Total Time for Session Not performed           Therapeutic Activity Total Time for Session Not performed           Group Total Time for Session Not performed

## 2019-10-02 NOTE — PROGRESS NOTES
PT DAILY NOTE FOR OUTPATIENT THERAPY    Patient: Nicole Tong   MRN: [id number]  : 1950 69 y.o.  Referring Physician: Checo Petit MD  Date of Visit: 10/2/2019      Certification Dates: 09/10/19 through 19    Diagnosis:   1. Adhesive capsulitis of left shoulder        TODAY'S VISIT          General Information - 10/02/19 0904        Session Details    Document Type daily treatment    Mode of Treatment physical therapy    Patient/Family Observations I am sore all over today. My neck hurts, i feel like i have a pinched nerve in my bottom, and my feet hurt. (Will discuss with PT)        Time Calculation    Start Time 0900    Stop Time 1000    Time Calculation (min) 60 min       General Information    Patient Profile Reviewed? yes              Pain/Vitals - 10/02/19 0903        Pain/Comfort/Sleep    Presence of Pain complains of pain/discomfort    Preferred Pain Scale number (Numeric Rating Pain Scale)    Pain Body Location shoulder    Pain Rating (0-10): Pre Activity 0    Pain Rating (0-10): Activity 5       Pain Intervention    Intervention  Manual    Post Intervention Comments See Assessment              Daily Falls Screen - 10/02/19 0905        Daily Falls Assessment    Patient reported fall since last visit No              Daily Treatment Assessment and Plan - 10/02/19 0941        Daily Treatment Assessment and Plan    Progress toward goals Slower than expected    Daily Outcome Summary Nicole arrived to PT with an increase in pain in her neck, shoulder, Bicep, back and feet. Discussed with Primary PT. Continued with session as usual. Patient did have discomfort with exercises. Discussed with PT who educated patient that she needs to get stronger to help decrease her pain. Patient was able to complete all exercises but did have pain. After completion patient stated she did not have an increase in pain. She is unsure on how it feels at the time. She stated she has to go home and do things around  the house to figure it out.           Today's Treatment:        Precautions Avoid prone and quadruped   Treatment Grp Current Session Time   Modalities Total Time for Session Not performed   Heat/Ice    Vasocompression    Modalities Total Time for Session Not performed   TENS    NMES    HWAVE    Manual  Total Time for Session 8-22 Minutes   STM/MFR/TPR Subclavius and pec minor, biceps   Instrument Assisted STM     Mobilizations    ROM/Flexibility PROM all planes pain free without restriction    Myofascial Decompression    Therapeutic Exercise Total Time for Session 23-37 Minutes    Sets Reps Load Comment    Rows  2 10 2 pl     Shoulder Extension  2 10 2 pl    Shoulder IR  2 15 yellow Crossbody    Shoulder ER   2 15 yellow Crossbody, pain at end range, completed in modified ROM    Shoulder Flexion    15 1# Back to wall    Shoulder Scaption     15 1# Back to wall    Shoulder Flexion    15 Orange Back to wall, 120 degrees                     Neuromuscular Re-Education Total Time for Session 8-22 Minutes    Sets Reps Load Comment   Alternating Isometrics   3  Supine, 90 degrees flexion    IR/ER PRE  2 15  90 degrees abduction, supine    S/L ER PRE   3  MC for scap control    S/L Scap Depression PRE   3  Long axis vibration through hand             Gait Total Time for Session Not performed           Therapeutic Activity Total Time for Session Not performed           Group Total Time for Session Not performed

## 2019-10-08 ENCOUNTER — HOSPITAL ENCOUNTER (OUTPATIENT)
Dept: PHYSICAL THERAPY | Age: 69
Setting detail: THERAPIES SERIES
Discharge: HOME | End: 2019-10-08
Attending: ORTHOPAEDIC SURGERY
Payer: MEDICARE

## 2019-10-08 DIAGNOSIS — M75.01 ADHESIVE CAPSULITIS OF RIGHT SHOULDER: ICD-10-CM

## 2019-10-08 DIAGNOSIS — M75.02 ADHESIVE CAPSULITIS OF LEFT SHOULDER: Primary | ICD-10-CM

## 2019-10-08 PROCEDURE — 97110 THERAPEUTIC EXERCISES: CPT | Mod: GP

## 2019-10-08 PROCEDURE — 97140 MANUAL THERAPY 1/> REGIONS: CPT | Mod: GP

## 2019-10-08 PROCEDURE — 97112 NEUROMUSCULAR REEDUCATION: CPT | Mod: GP

## 2019-10-08 NOTE — OP PT TREATMENT LOG
Precautions Avoid prone and quadruped   Treatment Grp Current Session Time   Modalities Total Time for Session Not performed   Heat/Ice    Vasocompression    Modalities Total Time for Session Not performed   TENS    NMES    HWAVE    Manual  Total Time for Session 8-22 Minutes   STM/MFR/TPR Subclavius and pec minor, biceps   Instrument Assisted STM     Mobilizations    ROM/Flexibility PROM all planes pain free without restriction    Myofascial Decompression    Therapeutic Exercise Total Time for Session 23-37 Minutes    Sets Reps Load Comment    S/L ER   2 15 3#    Standing ER  2 15 Yellow  Crossbody   Standing IR  2 15 Yellow  Crossbody                                                   Progress Note  Objective Assessments Completed        Neuromuscular Re-Education Total Time for Session 8-22 Minutes   R Sets Reps Load Comment   Rhythmic Stabilization   3  Supine, 90 degrees flexion, ER bias   S/L ER PRE   3  MC for scap control to prevent anterior GH translation   S/L Scap Depression PRE   3  Long axis vibration through hand             Gait Total Time for Session Not performed           Therapeutic Activity Total Time for Session Not performed           Group Total Time for Session Not performed

## 2019-10-08 NOTE — PROGRESS NOTES
PT PROGRESS NOTE FOR OUTPATIENT THERAPY    Patient: Nicole Tong   MRN: [id number]  : 1950 69 y.o.  Referring Physician: Checo Petit MD  Date of Visit: 10/8/2019      Certification Dates: 09/10/19 through 19    Recommended Frequency & Duration:  2 times/week for up to 8 weeks     Diagnosis:   1. Adhesive capsulitis of left shoulder    2. Adhesive capsulitis of right shoulder        TODAY'S VISIT:          General Information - 10/08/19 1108        Session Details    Document Type other (see comments)   Progress Note    Mode of Treatment individual therapy;physical therapy    Patient/Family Observations I continue to have pain with daily activity such as pushing my suitcase and puttin margaret my seatbelt.      [facility]       Time Calculation    Start Time 1100    Stop Time 1200    Time Calculation (min) 60 min              Pain/Vitals - 10/08/19 1116        Pain/Comfort/Sleep    Presence of Pain complains of pain/discomfort    Preferred Pain Scale number (Numeric Rating Pain Scale)    Pain Body Location shoulder    Pain Rating (0-10): Pre Activity 0    Pain Rating (0-10): Activity 5   Pinching and annoying    Pain Rating (0-10): Post Activity 0       Pain Intervention    Intervention  Manual techniques    Post Intervention Comments See assessment              Daily Falls Screen - 10/08/19 1117        Daily Falls Assessment    Patient reported fall since last visit No            OBJECTIVE MEASUREMENTS/DATA:    Eval Assessment          Evaluation Assessment and Plan - 10/08/19 1156        Evaluation Assessment and Plan    Plan of Care reviewed and patient/family in agreement Yes    System Pathology/Pathophysiology Noted musculoskeletal    Functional Limitations in Following Categories (PT Eval) self-care;home management;community/leisure    Rehab Potential/Prognosis good, to achieve stated therapy goals    Problem List decreased strength    Clinical Assessment Nicole is a 67 y/o female  who has completed 12 weeks of physical therapy following several months of right > left shoulder pain.  Imaging is + for right shoulder subscapularis, infraspinatus, and labral degeneration.  She would like to prevent surgery at this time and has continued to increase strength and ROM since last assessment.  Nicole continues to have pain with right shoulder end range mobility into flexion and rotation.  She continues to demonstrate difficulty with daily activiyt due to pain and fear based response with + imaging results.  Nicole was educated on the results of imaging and the pain response as a predictor of dysfuntion.  Nicole will benefit from physical therapy 4x a week for independent return to the fitness center.          ROM         Range of Motion - 10/08/19 1100        LEFT: Upper Extremity AROM Assessment    Shoulder Flexion Deficit 170 degrees    Shoulder Abduction Deficit 160 degrees    Shoulder Internal Rotation Deficit 55 degrees    Shoulder External Rotation Deficit 101 degrees       RIGHT: Upper Extremity AROM Assessment    Shoulder Flexion Deficit 170 degrees    Shoulder Abduction Deficit 160 degrees    Shoulder Internal Rotation Deficit 55 degrees    Shoulder External Rotation Deficit 110 degrees        MMT         Manual Muscle Tests - 10/08/19 1117        RIGHT: Upper Extremity Manual Muscle Test Assessment    Right UE MMT comments: Hand Held Dynamometry Supine    Shoulder Flexion gross movement --   13#    Shoulder Extension gross movement --   19.6#    Shoulder Abduction gross movement --   12.7#    Shoulder Internal Rotation gross movement --   8#    Shoulder External Rotation gross movement --   11#       LEFT: Upper Extremity Manual Muscle Test Assessment    Left UE MMT comments: Hand Held Dynamometry Supine    Shoulder Flexion gross movement --   16.5#    Shoulder Extension gross movement --   14.6#    Shoulder Abduction gross movement --   12.3#    Shoulder Internal Rotation gross movement --    9.2#    Shoulder External Rotation gross movement --   13.2#          Today's Treatment::        Precautions Avoid prone and quadruped   Treatment Grp Current Session Time   Modalities Total Time for Session Not performed   Heat/Ice    Vasocompression    Modalities Total Time for Session Not performed   TENS    NMES    HWAVE    Manual  Total Time for Session 8-22 Minutes   STM/MFR/TPR Subclavius and pec minor, biceps   Instrument Assisted STM     Mobilizations    ROM/Flexibility PROM all planes pain free without restriction    Myofascial Decompression    Therapeutic Exercise Total Time for Session 23-37 Minutes    Sets Reps Load Comment    S/L ER   2 15 3#    Standing ER  2 15 Yellow  Crossbody   Standing IR  2 15 Yellow  Crossbody                                                   Progress Note  Objective Assessments Completed        Neuromuscular Re-Education Total Time for Session 8-22 Minutes   R Sets Reps Load Comment   Rhythmic Stabilization   3  Supine, 90 degrees flexion, ER bias   S/L ER PRE   3  MC for scap control to prevent anterior GH translation   S/L Scap Depression PRE   3  Long axis vibration through hand             Gait Total Time for Session Not performed           Therapeutic Activity Total Time for Session Not performed           Group Total Time for Session Not performed                               Goals Addressed             Most Recent       Patient Stated    • Patient (pt-stated)   Improving (10/8/2019)             Return to yoga and use my arms without pain            Other    • LTG Shoulder   Improving (10/8/2019)             Long Term Goals Time Frame Result Progress   Pt will increase bilateral UE MMT into flexion, abduction, ER and IR >/= 8# pain free 8  weeks Ongoing    Pt will increase bilatera UE ROM WNL pain free to return to yoga class  8  weeks Ongoing    Lift overhead pain free  8  weeks Met    Increase CRISTINA >/= 90% to increase function   8  weeks Ongoing 9/10/19   61%  10/8/19 62%   Become independent with HEP  8  weeks Met    Verbalize understanding of hypermobility and safe exercise  8   weeks Met    Decrease pain at worst </= 2/10  8  weeks Ongoing              • STG Shoulder   Improving (10/8/2019)             Short Term Goals Time Frame Result Progress   Pt will increase bilateral UE MMT into flexion, abduction, ER and IR >/= 5# 4  weeks Met    Pt will increase bilateral UE ROM >/= WNL into flexion, extension to improve functional mobility without pain or fear of pain 4  weeks Met    Complete closed chain protraction and retraction pain free 4  weeks Met    Increase CRISTINA >/= 10 points to increase function   4  weeks Ongoing Initial 55%  8/6/19 47%  9/10/19  61%  10/8/19  62%   Become independent with HEP  4  weeks Met    Decrease pain at worst </= 4/10  4  weeks Met

## 2019-10-08 NOTE — OP PT TREATMENT LOG
Precautions Avoid prone and quadruped   Treatment Grp Current Session Time   Modalities Total Time for Session Not performed   Heat/Ice    Vasocompression    Modalities Total Time for Session Not performed   TENS    NMES    HWAVE    Manual  Total Time for Session 8-22 Minutes   STM/MFR/TPR Subclavius and pec minor, biceps   Instrument Assisted STM     Mobilizations    ROM/Flexibility PROM all planes pain free without restriction    Myofascial Decompression    Therapeutic Exercise Total Time for Session 38-52 Minutes    Sets Reps Load Comment    S/L ER   2 15 3#    Standing ER  2 15 Yellow  Crossbody   Standing IR  2 15 Yellow  Crossbody                                                   Progress Note  Objective Assessments Completed        Neuromuscular Re-Education Total Time for Session 8-22 Minutes   R Sets Reps Load Comment   Rhythmic Stabilization   3  Supine, 90 degrees flexion, ER bias   S/L ER PRE   3  MC for scap control to prevent anterior GH translation   S/L Scap Depression PRE   3  Long axis vibration through hand             Gait Total Time for Session Not performed           Therapeutic Activity Total Time for Session Not performed           Group Total Time for Session Not performed

## 2019-10-11 ENCOUNTER — HOSPITAL ENCOUNTER (OUTPATIENT)
Dept: PHYSICAL THERAPY | Age: 69
Setting detail: THERAPIES SERIES
Discharge: HOME | End: 2019-10-11
Attending: ORTHOPAEDIC SURGERY
Payer: MEDICARE

## 2019-10-11 DIAGNOSIS — M75.02 ADHESIVE CAPSULITIS OF LEFT SHOULDER: Primary | ICD-10-CM

## 2019-10-11 DIAGNOSIS — M75.01 ADHESIVE CAPSULITIS OF RIGHT SHOULDER: ICD-10-CM

## 2019-10-11 PROCEDURE — 97150 GROUP THERAPEUTIC PROCEDURES: CPT | Mod: GP

## 2019-10-11 PROCEDURE — 97110 THERAPEUTIC EXERCISES: CPT | Mod: 59

## 2019-10-11 NOTE — PROGRESS NOTES
PT DAILY NOTE FOR OUTPATIENT THERAPY    Patient: Nicole Tong   MRN: [id number]  : 1950 69 y.o.  Referring Physician: Checo Petit MD  Date of Visit: 10/11/2019      Certification Dates: 09/10/19 through 19    Diagnosis:   1. Adhesive capsulitis of left shoulder    2. Adhesive capsulitis of right shoulder          TODAY'S VISIT          General Information - 10/11/19 1208        Session Details    Document Type daily treatment    Mode of Treatment physical therapy    Patient/Family Observations I was able to go to yoga yesterday and modify my positions.     [facility]       Time Calculation    Start Time 1000    Stop Time 1100    Time Calculation (min) 60 min              Pain/Vitals - 10/11/19 1208        Pain/Comfort/Sleep    Presence of Pain denies       Pain Intervention    Intervention  No pain     Post Intervention Comments See assessment, general fatigue              Daily Falls Screen - 10/11/19 1209        Daily Falls Assessment    Patient reported fall since last visit No              Daily Treatment Assessment and Plan - 10/11/19 1209        Daily Treatment Assessment and Plan    Progress toward goals Progressing    Daily Outcome Summary Nicole was able to go to yoga yesterday and do yard work wit honly shoudler soreness and no pain.  Progressed POC th education on how to adjust cable column and seated row for carry over to fitness center.  Nicole was able to complete all exercises pain free today with only general soreness in her right shoulder upon completion.  Noted decreased strength in right vs left UE and pinching with standing shoudler abduction that improved with motion into the scapular plane.  Nicole will continue with therapy x 3 weeks to progress to independent fitness program.     Plan and Recommendations Progress independent strength programming.               Today's Treatment:        Precautions Avoid prone and quadruped   Treatment Grp Current Session  Time   Modalities Total Time for Session Not performed   Heat/Ice    Vasocompression    Modalities Total Time for Session Not performed   TENS    NMES    HWAVE    Manual  Total Time for Session Not performed   STM/MFR/TPR Subclavius and pec minor, biceps   Instrument Assisted STM     Mobilizations    ROM/Flexibility PROM all planes pain free without restriction    Myofascial Decompression    Therapeutic Exercise Total Time for Session 53-67 Minutes    Sets Reps Load Comment    UBE  X   45 RPM Standing, 3:3 min    B/L ER X 2 15 Yellow    Serratus Flexion  X 2 10  Foam roller up wall    TRX Row X 4 5     U/L Row X 2 10 2pl With trunk rotation   B/L Row   2 5 3pl Seated   U/L Row    15 1 pl Seated   Shoulder Flexion    15 1#    Shoulder Scaption    15 1#    Push up   3 5  Elevated table                      Neuromuscular Re-Education Total Time for Session Not performed    Sets Reps Load Comment   Alternating Isometrics   3  Supine, 90 degrees flexion    IR/ER PRE  2 15  90 degrees abduction, supine    S/L ER PRE   3  MC for scap control    S/L Scap Depression PRE   3  Long axis vibration through hand             Gait Total Time for Session Not performed           Therapeutic Activity Total Time for Session Not performed           Group Total Time for Session 23-37 Minutes

## 2019-10-11 NOTE — OP PT TREATMENT LOG
Precautions Avoid prone and quadruped   Treatment Grp Current Session Time   Modalities Total Time for Session Not performed   Heat/Ice    Vasocompression    Modalities Total Time for Session Not performed   TENS    NMES    HWAVE    Manual  Total Time for Session Not performed   STM/MFR/TPR Subclavius and pec minor, biceps   Instrument Assisted STM     Mobilizations    ROM/Flexibility PROM all planes pain free without restriction    Myofascial Decompression    Therapeutic Exercise Total Time for Session 53-67 Minutes    Sets Reps Load Comment    UBE  X   45 RPM Standing, 3:3 min    B/L ER X 2 15 Yellow    Serratus Flexion  X 2 10  Foam roller up wall    TRX Row X 4 5     U/L Row X 2 10 2pl With trunk rotation   B/L Row   2 5 3pl Seated   U/L Row    15 1 pl Seated   Shoulder Flexion    15 1#    Shoulder Scaption    15 1#    Push up   3 5  Elevated table                      Neuromuscular Re-Education Total Time for Session Not performed    Sets Reps Load Comment   Alternating Isometrics   3  Supine, 90 degrees flexion    IR/ER PRE  2 15  90 degrees abduction, supine    S/L ER PRE   3  MC for scap control    S/L Scap Depression PRE   3  Long axis vibration through hand             Gait Total Time for Session Not performed           Therapeutic Activity Total Time for Session Not performed           Group Total Time for Session 23-37 Minutes

## 2019-10-15 ENCOUNTER — HOSPITAL ENCOUNTER (OUTPATIENT)
Dept: PHYSICAL THERAPY | Age: 69
Setting detail: THERAPIES SERIES
Discharge: HOME | End: 2019-10-15
Attending: ORTHOPAEDIC SURGERY
Payer: MEDICARE

## 2019-10-15 DIAGNOSIS — M75.02 ADHESIVE CAPSULITIS OF LEFT SHOULDER: Primary | ICD-10-CM

## 2019-10-15 DIAGNOSIS — M75.01 ADHESIVE CAPSULITIS OF RIGHT SHOULDER: ICD-10-CM

## 2019-10-15 PROCEDURE — 97140 MANUAL THERAPY 1/> REGIONS: CPT | Mod: GP

## 2019-10-15 PROCEDURE — 97110 THERAPEUTIC EXERCISES: CPT | Mod: GP

## 2019-10-15 NOTE — PROGRESS NOTES
"PT DAILY NOTE FOR OUTPATIENT THERAPY    Patient: Nicole Tong   MRN: [id number]  : 1950 69 y.o.  Referring Physician: Checo Petit MD  Date of Visit: 10/15/2019      Certification Dates: 09/10/19 through 19    Diagnosis:   1. Adhesive capsulitis of left shoulder    2. Adhesive capsulitis of right shoulder            TODAY'S VISIT    General Information - 10/15/19 1301        Session Details    Document Type  daily treatment     Mode of Treatment  physical therapy     Patient/Family/Caregiver Comments/Observations  I feel a little stiff today, I have been donig a lot at home in regards to home managment.      OP Specialty  Orthopedics        Time Calculation    Start Time  1300     Stop Time  1400     Time Calculation (min)  60 min        General Information    Patient Profile Reviewed?  yes         Pain/Vitals - 10/15/19 1302        Pain/Comfort/Sleep    Currently in Pain  Yes     Preferred Pain Scale  number (Numeric Rating Pain Scale)     Pain Body Location - Side  Right     Pain Body Location  arm     Pain Rating (0-10): Pre Activity  1     Pain Rating (0-10): Activity  5     Pain Rating (0-10): Post Activity  0         Daily Falls Screen - 10/15/19 1303        Daily Falls Assessment    Patient reported fall since last visit  No         Daily Treatment Assessment and Plan - 10/15/19 1456        Daily Treatment Assessment and Plan    Progress toward goals  Progressing     Daily Outcome Summary  Nicole is frustrated with continued discomfort in her shoulder, she also states \"my shoulder does not feel like my shoudler anymore\".  Continued education provided on progression of exercise to improve shoulder stability as she continues to present as a multidirectional instability without subluxation of dislocation at this time.  Strength progression completed with reports of 1/10 discomfort but no acute pain a this time.  Continued education on return to independent fitness routine and use of 2 free "  sessions to allow for improved orientation to fitness equipment.  No mechanical dysfunciton noted during manual technqiues, continue with PT x 3 weeks to return to independent exercise.      Plan and Recommendations  Progress independent strength programming.          Today's Treatment:      Precautions Avoid prone and quadruped   Treatment Grp Current Session Time   Modalities Total Time for Session Not performed   Heat/Ice    Vasocompression    Modalities Total Time for Session Not performed   TENS    NMES    HWAVE    Manual  Total Time for Session 8-22 Minutes   STM/MFR/TPR Subclavius and pec minor, biceps   Instrument Assisted STM     Mobilizations    ROM/Flexibility PROM all planes pain free without restriction    Myofascial Decompression    Therapeutic Exercise Total Time for Session 38-52 Minutes    Sets Reps Load Comment    UBE     45 RPM Standing, 3:3 min    B/L ER  2 15 Yellow    TRX Row  3 8     U/L Row  3 8 2pl With trunk rotation   B/L Row   4 5 3pl Seated   Shoulder Flexion   2 8 2#    Shoulder Scaption   2 8 2#    Push up   3 5  Elevated table    Upside Down KB Hold   3  Supine elbow at side, Hold to fatigue                     Neuromuscular Re-Education Total Time for Session Not performed    Sets Reps Load Comment   Alternating Isometrics   3  Supine, 90 degrees flexion    IR/ER PRE  2 15  90 degrees abduction, supine    S/L ER PRE   3  MC for scap control    S/L Scap Depression PRE   3  Long axis vibration through hand             Gait Total Time for Session Not performed           Therapeutic Activity Total Time for Session Not performed           Group Total Time for Session Not performed

## 2019-10-15 NOTE — OP PT TREATMENT LOG
Precautions Avoid prone and quadruped   Treatment Grp Current Session Time   Modalities Total Time for Session Not performed   Heat/Ice    Vasocompression    Modalities Total Time for Session Not performed   TENS    NMES    HWAVE    Manual  Total Time for Session 8-22 Minutes   STM/MFR/TPR Subclavius and pec minor, biceps   Instrument Assisted STM     Mobilizations    ROM/Flexibility PROM all planes pain free without restriction    Myofascial Decompression    Therapeutic Exercise Total Time for Session 38-52 Minutes    Sets Reps Load Comment    UBE     45 RPM Standing, 3:3 min    B/L ER  2 15 Yellow    TRX Row  3 8     U/L Row  3 8 2pl With trunk rotation   B/L Row   4 5 3pl Seated   Shoulder Flexion   2 8 2#    Shoulder Scaption   2 8 2#    Push up   3 5  Elevated table    Upside Down KB Hold   3  Supine elbow at side, Hold to fatigue                     Neuromuscular Re-Education Total Time for Session Not performed    Sets Reps Load Comment   Alternating Isometrics   3  Supine, 90 degrees flexion    IR/ER PRE  2 15  90 degrees abduction, supine    S/L ER PRE   3  MC for scap control    S/L Scap Depression PRE   3  Long axis vibration through hand             Gait Total Time for Session Not performed           Therapeutic Activity Total Time for Session Not performed           Group Total Time for Session Not performed

## 2019-10-17 ENCOUNTER — HOSPITAL ENCOUNTER (OUTPATIENT)
Dept: PHYSICAL THERAPY | Age: 69
Setting detail: THERAPIES SERIES
Discharge: HOME | End: 2019-10-17
Attending: ORTHOPAEDIC SURGERY
Payer: MEDICARE

## 2019-10-17 DIAGNOSIS — M75.02 ADHESIVE CAPSULITIS OF LEFT SHOULDER: Primary | ICD-10-CM

## 2019-10-17 PROCEDURE — 97110 THERAPEUTIC EXERCISES: CPT | Mod: GP

## 2019-10-17 PROCEDURE — 97140 MANUAL THERAPY 1/> REGIONS: CPT | Mod: GP

## 2019-10-17 NOTE — OP PT TREATMENT LOG
Precautions Avoid prone and quadruped   Treatment Grp Current Session Time   Modalities Total Time for Session Not performed   Heat/Ice    Vasocompression    Modalities Total Time for Session Not performed   TENS    NMES    HWAVE    Manual  Total Time for Session 8-22 Minutes   STM/MFR/TPR Subclavius and pec minor, biceps- Post Session   Instrument Assisted STM     Mobilizations    ROM/Flexibility    Myofascial Decompression    Therapeutic Exercise Total Time for Session 38-52 Minutes    Sets Reps Load Comment    UBE     45 RPM Standing, 3:3 min    B/L ER  2 15 Yellow    TRX Row  3 10     U/L Row  3 8 2pl With trunk rotation and Split stance.    B/L Row   4 5 3pl At seated Hoist   Shoulder Flexion   2 8 2#    Shoulder Scaption   2 8 2#    Push up   3 5  Elevated table    Upside Down KB Hold   3  Supine elbow at side, Hold to fatigue   Horizontal Abd  3 10 [place]              Neuromuscular Re-Education Total Time for Session Not performed    Sets Reps Load Comment   Alternating Isometrics   3  Supine, 90 degrees flexion    IR/ER PRE  2 15  90 degrees abduction, supine    S/L ER PRE   3  MC for scap control    S/L Scap Depression PRE   3  Long axis vibration through hand             Gait Total Time for Session Not performed           Therapeutic Activity Total Time for Session Not performed           Group Total Time for Session Not performed

## 2019-10-17 NOTE — PROGRESS NOTES
PT DAILY NOTE FOR OUTPATIENT THERAPY    Patient: Nicole Tong   MRN: [id number]  : 1950 69 y.o.  Referring Physician: Checo Petit MD  Date of Visit: 10/17/2019      Certification Dates: 09/10/19 through 19    Diagnosis:   1. Adhesive capsulitis of left shoulder        TODAY'S VISIT    General Information - 10/17/19 1302        Session Details    Document Type  daily treatment     Mode of Treatment  physical therapy     Patient/Family/Caregiver Comments/Observations  I am feeling pretty good today. I still have pain in the same spots, but its only a 2/10.         Time Calculation    Start Time  1300     Stop Time  1400     Time Calculation (min)  60 min        General Information    Patient Profile Reviewed?  yes         Pain/Vitals - 10/17/19 1301        Pain Assessment    Currently in pain  Yes     Preferred Pain Scale  number (Numeric Rating Pain Scale)     Pain Body Location - Side  Right     Pain: Body location  Shoulder     Pain Rating (0-10): Pre Activity  2     Pain Rating (0-10): Activity  2        Pain Intervention    Intervention   TE     Post Intervention Comments  See Assessment         Daily Falls Screen - 10/17/19 1303        Daily Falls Assessment    Patient reported fall since last visit  No         Daily Treatment Assessment and Plan - 10/17/19 1338        Daily Treatment Assessment and Plan    Progress toward goals  Progressing     Daily Outcome Summary  Nicole arrived with 2/10 pain. After warm up on UBE continued with exercises to build strength in shoulders. Reported pain in hands due to arthritis with U/L rowing exercise, but was able to tolerate. Added horizontal Abd exercise due to extra time in session. Ended with STM to loosen up muscles and continue to decrease pain. Continuing to progress from PT to gym program. Nicole reports uneasyness with this transition. Encouraged Nicole. Post session 1/10 pain.            Today's Treatment:      Precautions Avoid prone and  quadruped   Treatment Grp Current Session Time   Modalities Total Time for Session Not performed   Heat/Ice    Vasocompression    Modalities Total Time for Session Not performed   TENS    NMES    HWAVE    Manual  Total Time for Session 8-22 Minutes   STM/MFR/TPR Subclavius and pec minor, biceps- Post Session   Instrument Assisted STM     Mobilizations    ROM/Flexibility    Myofascial Decompression    Therapeutic Exercise Total Time for Session 38-52 Minutes    Sets Reps Load Comment    UBE     45 RPM Standing, 3:3 min    B/L ER  2 15 Yellow    TRX Row  3 10     U/L Row  3 8 2pl With trunk rotation and Split stance.    B/L Row   4 5 3pl At seated Hoist   Shoulder Flexion   2 8 2#    Shoulder Scaption   2 8 2#    Push up   3 5  Elevated table    Upside Down KB Hold   3  Supine elbow at side, Hold to fatigue   Horizontal Abd  3 10 [place]              Neuromuscular Re-Education Total Time for Session Not performed    Sets Reps Load Comment   Alternating Isometrics   3  Supine, 90 degrees flexion    IR/ER PRE  2 15  90 degrees abduction, supine    S/L ER PRE   3  MC for scap control    S/L Scap Depression PRE   3  Long axis vibration through hand             Gait Total Time for Session Not performed           Therapeutic Activity Total Time for Session Not performed           Group Total Time for Session Not performed

## 2019-10-22 ENCOUNTER — HOSPITAL ENCOUNTER (OUTPATIENT)
Dept: PHYSICAL THERAPY | Age: 69
Setting detail: THERAPIES SERIES
Discharge: HOME | End: 2019-10-22
Attending: ORTHOPAEDIC SURGERY
Payer: MEDICARE

## 2019-10-22 DIAGNOSIS — M75.02 ADHESIVE CAPSULITIS OF LEFT SHOULDER: Primary | ICD-10-CM

## 2019-10-22 PROCEDURE — 97110 THERAPEUTIC EXERCISES: CPT | Mod: GP,KX

## 2019-10-22 NOTE — OP PT TREATMENT LOG
Precautions Avoid prone and quadruped   Treatment Grp Current Session Time   Modalities Total Time for Session Not performed   Heat/Ice    Vasocompression    Modalities Total Time for Session Not performed   TENS    NMES    HWAVE    Manual  Total Time for Session Not performed   STM/MFR/TPR    Instrument Assisted STM     Mobilizations    ROM/Flexibility    Myofascial Decompression    Therapeutic Exercise Total Time for Session 53-67 Minutes    Sets Reps Load Comment    UBE     45 RPM Standing, 3:3 min    B/L ER  2 20 Yellow    TRX Row  3 12     U/L Row  3 8 2pl With trunk rotation and Split stance.    Seated Mid Row Machine  4 5 3pl At seated Hoist   Shoulder Flexion   3 8 2#    Shoulder Scaption   3 8 2#    Push up   3 5  Elevated table    Upside Down KB Hold   5 30s Seated with Hip Add. elbow at side, Hold to fatigue   Horizontal Abd  3 10 [employer]              Neuromuscular Re-Education Total Time for Session Not performed    Sets Reps Load Comment   Alternating Isometrics   3  Supine, 90 degrees flexion    IR/ER PRE  2 15  90 degrees abduction, supine    S/L ER PRE   3  MC for scap control    S/L Scap Depression PRE   3  Long axis vibration through hand             Gait Total Time for Session Not performed           Therapeutic Activity Total Time for Session Not performed           Group Total Time for Session Not performed

## 2019-10-22 NOTE — PROGRESS NOTES
PT DAILY NOTE FOR OUTPATIENT THERAPY    Patient: Nicole Tong   MRN: [id number]  : 1950 69 y.o.  Referring Physician: Checo Petit MD  Date of Visit: 10/22/2019      Certification Dates: 09/10/19 through 19    Diagnosis:   1. Adhesive capsulitis of left shoulder        TODAY'S VISIT    General Information - 10/22/19 1107        Session Details    Document Type  daily treatment     Mode of Treatment  physical therapy     Patient/Family/Caregiver Comments/Observations  I was having a lot of pain in my shoulder last night. 4/10 pain.      OP Specialty  Orthopedics        Time Calculation    Start Time  1100     Stop Time  1200     Time Calculation (min)  60 min        General Information    Patient Profile Reviewed?  yes         Pain/Vitals - 10/22/19 1108        Pain Assessment    Currently in pain  Yes     Preferred Pain Scale  number (Numeric Rating Pain Scale)     Pain Body Location - Side  Right     Pain: Body location  Shoulder     Pain Rating (0-10): Pre Activity  4     Pain Rating (0-10): Activity  4        Pain Intervention    Intervention   TE     Post Intervention Comments  See Assessment         Daily Falls Screen - 10/22/19 1109        Daily Falls Assessment    Patient reported fall since last visit  No         Daily Treatment Assessment and Plan - 10/22/19 1133        Daily Treatment Assessment and Plan    Progress toward goals  Progressing     Daily Outcome Summary  PT RA instructed for no manual this session, as patient is in transition to independent at fitness center. Nicole arrived to PT with 4/10 pain this session. Stated she was having more pain this weekend in her shoulders. Educated patient about plan for transition and that as she gets stronger it should decrease her pain. Continued with exercises on patients POC. No pain in shoulders verbalized throughout session. Patient did have pain in her hands from her arthritis with U/L rowing exercise. Allowed for rest in between  before performing B/L Row. Dhruv was able to tolerate. Advanced KB carry to seated this session. Good tolerance as well as no reports of pain. Nicole would benefit from continued PT to allow pain free life and return to PLOF.            Today's Treatment:      Precautions Avoid prone and quadruped   Treatment Grp Current Session Time   Modalities Total Time for Session Not performed   Heat/Ice    Vasocompression    Modalities Total Time for Session Not performed   TENS    NMES    HWAVE    Manual  Total Time for Session Not performed   STM/MFR/TPR    Instrument Assisted STM     Mobilizations    ROM/Flexibility    Myofascial Decompression    Therapeutic Exercise Total Time for Session 53-67 Minutes    Sets Reps Load Comment    UBE     45 RPM Standing, 3:3 min    B/L ER  2 20 Yellow    TRX Row  3 12     U/L Row  3 8 2pl With trunk rotation and Split stance.    Seated Mid Row Machine  4 5 3pl At seated Hoist   Shoulder Flexion   3 8 2#    Shoulder Scaption   3 8 2#    Push up   3 5  Elevated table    Upside Down KB Hold   5 30s Seated with Hip Add. elbow at side, Hold to fatigue   Horizontal Abd  3 10 [place]              Neuromuscular Re-Education Total Time for Session Not performed    Sets Reps Load Comment   Alternating Isometrics   3  Supine, 90 degrees flexion    IR/ER PRE  2 15  90 degrees abduction, supine    S/L ER PRE   3  MC for scap control    S/L Scap Depression PRE   3  Long axis vibration through hand             Gait Total Time for Session Not performed           Therapeutic Activity Total Time for Session Not performed           Group Total Time for Session Not performed

## 2019-11-08 ENCOUNTER — DOCUMENTATION (OUTPATIENT)
Dept: PHYSICAL THERAPY | Age: 69
End: 2019-11-08
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
## 2023-01-18 ENCOUNTER — TRANSCRIBE ORDERS (OUTPATIENT)
Dept: SCHEDULING | Age: 73
End: 2023-01-18

## 2023-01-18 DIAGNOSIS — Z86.0100 PERSONAL HISTORY OF COLONIC POLYPS: Primary | ICD-10-CM

## 2023-01-24 ENCOUNTER — HOSPITAL ENCOUNTER (OUTPATIENT)
Dept: RADIOLOGY | Facility: HOSPITAL | Age: 73
Discharge: HOME | End: 2023-01-24
Attending: INTERNAL MEDICINE
Payer: MEDICARE

## 2023-01-24 DIAGNOSIS — Z86.0100 PERSONAL HISTORY OF COLONIC POLYPS: ICD-10-CM

## 2023-01-24 PROCEDURE — 74263 CT COLONOGRAPHY SCREENING: CPT | Mod: GY
